# Patient Record
Sex: FEMALE | Race: WHITE | Employment: FULL TIME | URBAN - METROPOLITAN AREA
[De-identification: names, ages, dates, MRNs, and addresses within clinical notes are randomized per-mention and may not be internally consistent; named-entity substitution may affect disease eponyms.]

---

## 2020-06-09 ENCOUNTER — HOSPITAL ENCOUNTER (OUTPATIENT)
Dept: CT IMAGING | Age: 50
Discharge: HOME | End: 2020-06-09
Attending: INTERNAL MEDICINE
Payer: COMMERCIAL

## 2020-06-09 DIAGNOSIS — Z12.11 COLON CANCER SCREENING: ICD-10-CM

## 2020-06-09 DIAGNOSIS — R10.32 LLQ ABDOMINAL PAIN: ICD-10-CM

## 2020-06-09 DIAGNOSIS — R11.0 NAUSEA: ICD-10-CM

## 2020-06-09 PROCEDURE — 74011636320 HC RX REV CODE- 636/320: Performed by: INTERNAL MEDICINE

## 2020-06-09 PROCEDURE — 74011000255 HC RX REV CODE- 255: Performed by: INTERNAL MEDICINE

## 2020-06-09 PROCEDURE — 74177 CT ABD & PELVIS W/CONTRAST: CPT

## 2020-06-09 RX ORDER — SODIUM CHLORIDE 0.9 % (FLUSH) 0.9 %
10 SYRINGE (ML) INJECTION
Status: COMPLETED | OUTPATIENT
Start: 2020-06-09 | End: 2020-06-09

## 2020-06-09 RX ORDER — BARIUM SULFATE 20 MG/ML
900 SUSPENSION ORAL
Status: COMPLETED | OUTPATIENT
Start: 2020-06-09 | End: 2020-06-09

## 2020-06-09 RX ADMIN — IOPAMIDOL 100 ML: 755 INJECTION, SOLUTION INTRAVENOUS at 09:17

## 2020-06-09 RX ADMIN — BARIUM SULFATE 900 ML: 21 SUSPENSION ORAL at 09:17

## 2020-06-09 RX ADMIN — Medication 10 ML: at 09:17

## 2021-07-19 ENCOUNTER — APPOINTMENT (OUTPATIENT)
Dept: MRI IMAGING | Age: 51
DRG: 440 | End: 2021-07-19
Attending: INTERNAL MEDICINE
Payer: COMMERCIAL

## 2021-07-19 ENCOUNTER — HOSPITAL ENCOUNTER (INPATIENT)
Age: 51
LOS: 2 days | Discharge: HOME | DRG: 440 | End: 2021-07-21
Attending: EMERGENCY MEDICINE | Admitting: INTERNAL MEDICINE
Payer: COMMERCIAL

## 2021-07-19 ENCOUNTER — APPOINTMENT (OUTPATIENT)
Dept: CT IMAGING | Age: 51
DRG: 440 | End: 2021-07-19
Attending: EMERGENCY MEDICINE
Payer: COMMERCIAL

## 2021-07-19 DIAGNOSIS — K85.90 ACUTE PANCREATITIS, UNSPECIFIED COMPLICATION STATUS, UNSPECIFIED PANCREATITIS TYPE: Primary | ICD-10-CM

## 2021-07-19 LAB
ALBUMIN SERPL-MCNC: 3.2 G/DL (ref 3.5–5)
ALBUMIN/GLOB SERPL: 0.9 {RATIO} (ref 1.1–2.2)
ALP SERPL-CCNC: 98 U/L (ref 45–117)
ALT SERPL-CCNC: 75 U/L (ref 12–78)
ANION GAP SERPL CALC-SCNC: 5 MMOL/L (ref 5–15)
APPEARANCE UR: CLEAR
AST SERPL-CCNC: 112 U/L (ref 15–37)
BACTERIA URNS QL MICRO: NEGATIVE /HPF
BASOPHILS # BLD: 0 K/UL (ref 0–0.1)
BASOPHILS NFR BLD: 1 % (ref 0–1)
BILIRUB SERPL-MCNC: 0.7 MG/DL (ref 0.2–1)
BILIRUB UR QL: NEGATIVE
BUN SERPL-MCNC: 17 MG/DL (ref 6–20)
BUN/CREAT SERPL: 21 (ref 12–20)
CALCIUM SERPL-MCNC: 8.2 MG/DL (ref 8.5–10.1)
CHLORIDE SERPL-SCNC: 104 MMOL/L (ref 97–108)
CO2 SERPL-SCNC: 29 MMOL/L (ref 21–32)
COLOR UR: ABNORMAL
CREAT SERPL-MCNC: 0.82 MG/DL (ref 0.55–1.02)
DIFFERENTIAL METHOD BLD: NORMAL
EOSINOPHIL # BLD: 0.1 K/UL (ref 0–0.4)
EOSINOPHIL NFR BLD: 1 % (ref 0–7)
EPITH CASTS URNS QL MICRO: ABNORMAL /LPF
ERYTHROCYTE [DISTWIDTH] IN BLOOD BY AUTOMATED COUNT: 12.6 % (ref 11.5–14.5)
GLOBULIN SER CALC-MCNC: 3.7 G/DL (ref 2–4)
GLUCOSE SERPL-MCNC: 96 MG/DL (ref 65–100)
GLUCOSE UR STRIP.AUTO-MCNC: NEGATIVE MG/DL
HCT VFR BLD AUTO: 41.3 % (ref 35–47)
HGB BLD-MCNC: 13.1 G/DL (ref 11.5–16)
HGB UR QL STRIP: ABNORMAL
HYALINE CASTS URNS QL MICRO: ABNORMAL /LPF (ref 0–5)
IMM GRANULOCYTES # BLD AUTO: 0 K/UL (ref 0–0.04)
IMM GRANULOCYTES NFR BLD AUTO: 0 % (ref 0–0.5)
KETONES UR QL STRIP.AUTO: NEGATIVE MG/DL
LEUKOCYTE ESTERASE UR QL STRIP.AUTO: ABNORMAL
LIPASE SERPL-CCNC: 2116 U/L (ref 73–393)
LIPASE SERPL-CCNC: >3000 U/L (ref 73–393)
LYMPHOCYTES # BLD: 1.9 K/UL (ref 0.8–3.5)
LYMPHOCYTES NFR BLD: 25 % (ref 12–49)
MCH RBC QN AUTO: 29.3 PG (ref 26–34)
MCHC RBC AUTO-ENTMCNC: 31.7 G/DL (ref 30–36.5)
MCV RBC AUTO: 92.4 FL (ref 80–99)
MONOCYTES # BLD: 0.5 K/UL (ref 0–1)
MONOCYTES NFR BLD: 7 % (ref 5–13)
NEUTS SEG # BLD: 5.2 K/UL (ref 1.8–8)
NEUTS SEG NFR BLD: 66 % (ref 32–75)
NITRITE UR QL STRIP.AUTO: NEGATIVE
NRBC # BLD: 0 K/UL (ref 0–0.01)
NRBC BLD-RTO: 0 PER 100 WBC
PH UR STRIP: 6 [PH] (ref 5–8)
PLATELET # BLD AUTO: 199 K/UL (ref 150–400)
PMV BLD AUTO: 10.7 FL (ref 8.9–12.9)
POTASSIUM SERPL-SCNC: 3.7 MMOL/L (ref 3.5–5.1)
PROT SERPL-MCNC: 6.9 G/DL (ref 6.4–8.2)
PROT UR STRIP-MCNC: NEGATIVE MG/DL
RBC # BLD AUTO: 4.47 M/UL (ref 3.8–5.2)
RBC #/AREA URNS HPF: ABNORMAL /HPF (ref 0–5)
SODIUM SERPL-SCNC: 138 MMOL/L (ref 136–145)
SP GR UR REFRACTOMETRY: >1.03 (ref 1–1.03)
UA: UC IF INDICATED,UAUC: ABNORMAL
UROBILINOGEN UR QL STRIP.AUTO: 1 EU/DL (ref 0.2–1)
WBC # BLD AUTO: 7.8 K/UL (ref 3.6–11)
WBC URNS QL MICRO: ABNORMAL /HPF (ref 0–4)

## 2021-07-19 PROCEDURE — 97162 PT EVAL MOD COMPLEX 30 MIN: CPT

## 2021-07-19 PROCEDURE — 87086 URINE CULTURE/COLONY COUNT: CPT

## 2021-07-19 PROCEDURE — 74011000636 HC RX REV CODE- 636: Performed by: EMERGENCY MEDICINE

## 2021-07-19 PROCEDURE — 97116 GAIT TRAINING THERAPY: CPT

## 2021-07-19 PROCEDURE — 74011250636 HC RX REV CODE- 250/636: Performed by: INTERNAL MEDICINE

## 2021-07-19 PROCEDURE — 74177 CT ABD & PELVIS W/CONTRAST: CPT

## 2021-07-19 PROCEDURE — 74011250636 HC RX REV CODE- 250/636: Performed by: EMERGENCY MEDICINE

## 2021-07-19 PROCEDURE — 74011250637 HC RX REV CODE- 250/637: Performed by: INTERNAL MEDICINE

## 2021-07-19 PROCEDURE — 97530 THERAPEUTIC ACTIVITIES: CPT

## 2021-07-19 PROCEDURE — 96374 THER/PROPH/DIAG INJ IV PUSH: CPT

## 2021-07-19 PROCEDURE — 94760 N-INVAS EAR/PLS OXIMETRY 1: CPT

## 2021-07-19 PROCEDURE — 74011250636 HC RX REV CODE- 250/636

## 2021-07-19 PROCEDURE — 81001 URINALYSIS AUTO W/SCOPE: CPT

## 2021-07-19 PROCEDURE — 99285 EMERGENCY DEPT VISIT HI MDM: CPT

## 2021-07-19 PROCEDURE — 83690 ASSAY OF LIPASE: CPT

## 2021-07-19 PROCEDURE — 80053 COMPREHEN METABOLIC PANEL: CPT

## 2021-07-19 PROCEDURE — 65270000029 HC RM PRIVATE

## 2021-07-19 PROCEDURE — 85025 COMPLETE CBC W/AUTO DIFF WBC: CPT

## 2021-07-19 PROCEDURE — 36415 COLL VENOUS BLD VENIPUNCTURE: CPT

## 2021-07-19 RX ORDER — PANTOPRAZOLE SODIUM 40 MG/1
40 TABLET, DELAYED RELEASE ORAL
Status: DISCONTINUED | OUTPATIENT
Start: 2021-07-20 | End: 2021-07-21 | Stop reason: HOSPADM

## 2021-07-19 RX ORDER — PROMETHAZINE HYDROCHLORIDE 25 MG/1
12.5 TABLET ORAL
Status: DISCONTINUED | OUTPATIENT
Start: 2021-07-19 | End: 2021-07-21 | Stop reason: HOSPADM

## 2021-07-19 RX ORDER — ACETAMINOPHEN 650 MG/1
650 SUPPOSITORY RECTAL
Status: DISCONTINUED | OUTPATIENT
Start: 2021-07-19 | End: 2021-07-21 | Stop reason: HOSPADM

## 2021-07-19 RX ORDER — HYDROCODONE BITARTRATE AND ACETAMINOPHEN 5; 325 MG/1; MG/1
1 TABLET ORAL
COMMUNITY
End: 2021-07-21

## 2021-07-19 RX ORDER — BUPROPION HYDROCHLORIDE 200 MG/1
200 TABLET, EXTENDED RELEASE ORAL DAILY
COMMUNITY

## 2021-07-19 RX ORDER — LORAZEPAM 2 MG/ML
1 INJECTION INTRAMUSCULAR
Status: DISCONTINUED | OUTPATIENT
Start: 2021-07-19 | End: 2021-07-20

## 2021-07-19 RX ORDER — SODIUM CHLORIDE 0.9 % (FLUSH) 0.9 %
5-40 SYRINGE (ML) INJECTION EVERY 8 HOURS
Status: DISCONTINUED | OUTPATIENT
Start: 2021-07-19 | End: 2021-07-21 | Stop reason: HOSPADM

## 2021-07-19 RX ORDER — SIMETHICONE 125 MG
125 TABLET,CHEWABLE ORAL
COMMUNITY

## 2021-07-19 RX ORDER — ACETAMINOPHEN 325 MG/1
650 TABLET ORAL
Status: DISCONTINUED | OUTPATIENT
Start: 2021-07-19 | End: 2021-07-21 | Stop reason: HOSPADM

## 2021-07-19 RX ORDER — CITALOPRAM 20 MG/1
40 TABLET, FILM COATED ORAL DAILY
Status: DISCONTINUED | OUTPATIENT
Start: 2021-07-19 | End: 2021-07-21 | Stop reason: HOSPADM

## 2021-07-19 RX ORDER — ONDANSETRON 2 MG/ML
4 INJECTION INTRAMUSCULAR; INTRAVENOUS
Status: COMPLETED | OUTPATIENT
Start: 2021-07-19 | End: 2021-07-19

## 2021-07-19 RX ORDER — PANTOPRAZOLE SODIUM 40 MG/1
40 TABLET, DELAYED RELEASE ORAL DAILY
COMMUNITY

## 2021-07-19 RX ORDER — CITALOPRAM 40 MG/1
40 TABLET, FILM COATED ORAL DAILY
COMMUNITY

## 2021-07-19 RX ORDER — SODIUM CHLORIDE, SODIUM LACTATE, POTASSIUM CHLORIDE, CALCIUM CHLORIDE 600; 310; 30; 20 MG/100ML; MG/100ML; MG/100ML; MG/100ML
75 INJECTION, SOLUTION INTRAVENOUS CONTINUOUS
Status: DISCONTINUED | OUTPATIENT
Start: 2021-07-19 | End: 2021-07-21 | Stop reason: HOSPADM

## 2021-07-19 RX ORDER — BISACODYL 5 MG
5 TABLET, DELAYED RELEASE (ENTERIC COATED) ORAL DAILY PRN
Status: DISCONTINUED | OUTPATIENT
Start: 2021-07-19 | End: 2021-07-21 | Stop reason: HOSPADM

## 2021-07-19 RX ORDER — BUPROPION HYDROCHLORIDE 100 MG/1
100 TABLET, EXTENDED RELEASE ORAL 2 TIMES DAILY
Status: DISCONTINUED | OUTPATIENT
Start: 2021-07-19 | End: 2021-07-21 | Stop reason: HOSPADM

## 2021-07-19 RX ORDER — HYDROMORPHONE HYDROCHLORIDE 1 MG/ML
0.5 INJECTION, SOLUTION INTRAMUSCULAR; INTRAVENOUS; SUBCUTANEOUS
Status: DISCONTINUED | OUTPATIENT
Start: 2021-07-19 | End: 2021-07-21 | Stop reason: HOSPADM

## 2021-07-19 RX ORDER — ENOXAPARIN SODIUM 100 MG/ML
40 INJECTION SUBCUTANEOUS DAILY
Status: DISCONTINUED | OUTPATIENT
Start: 2021-07-19 | End: 2021-07-21 | Stop reason: HOSPADM

## 2021-07-19 RX ORDER — ONDANSETRON 2 MG/ML
4 INJECTION INTRAMUSCULAR; INTRAVENOUS
Status: DISCONTINUED | OUTPATIENT
Start: 2021-07-19 | End: 2021-07-21 | Stop reason: HOSPADM

## 2021-07-19 RX ORDER — ATORVASTATIN CALCIUM 10 MG/1
10 TABLET, FILM COATED ORAL DAILY
Status: ON HOLD | COMMUNITY
End: 2021-07-21 | Stop reason: SDUPTHER

## 2021-07-19 RX ORDER — IBUPROFEN 200 MG
800 TABLET ORAL
COMMUNITY

## 2021-07-19 RX ORDER — CETIRIZINE HYDROCHLORIDE 10 MG/1
10 CAPSULE, LIQUID FILLED ORAL DAILY
COMMUNITY

## 2021-07-19 RX ORDER — ONDANSETRON 2 MG/ML
INJECTION INTRAMUSCULAR; INTRAVENOUS
Status: COMPLETED
Start: 2021-07-19 | End: 2021-07-19

## 2021-07-19 RX ORDER — SODIUM CHLORIDE 0.9 % (FLUSH) 0.9 %
5-40 SYRINGE (ML) INJECTION AS NEEDED
Status: DISCONTINUED | OUTPATIENT
Start: 2021-07-19 | End: 2021-07-21 | Stop reason: HOSPADM

## 2021-07-19 RX ADMIN — SODIUM CHLORIDE, POTASSIUM CHLORIDE, SODIUM LACTATE AND CALCIUM CHLORIDE 150 ML/HR: 600; 310; 30; 20 INJECTION, SOLUTION INTRAVENOUS at 10:42

## 2021-07-19 RX ADMIN — BUPROPION HYDROCHLORIDE 100 MG: 100 TABLET, EXTENDED RELEASE ORAL at 11:36

## 2021-07-19 RX ADMIN — CITALOPRAM HYDROBROMIDE 40 MG: 20 TABLET ORAL at 11:36

## 2021-07-19 RX ADMIN — SODIUM CHLORIDE 1000 ML: 9 INJECTION, SOLUTION INTRAVENOUS at 05:10

## 2021-07-19 RX ADMIN — ONDANSETRON 4 MG: 2 INJECTION INTRAMUSCULAR; INTRAVENOUS at 05:10

## 2021-07-19 RX ADMIN — SODIUM CHLORIDE, POTASSIUM CHLORIDE, SODIUM LACTATE AND CALCIUM CHLORIDE 150 ML/HR: 600; 310; 30; 20 INJECTION, SOLUTION INTRAVENOUS at 18:54

## 2021-07-19 RX ADMIN — Medication 10 ML: at 16:23

## 2021-07-19 RX ADMIN — IOPAMIDOL 100 ML: 755 INJECTION, SOLUTION INTRAVENOUS at 05:41

## 2021-07-19 RX ADMIN — PROMETHAZINE HYDROCHLORIDE 12.5 MG: 25 TABLET ORAL at 22:38

## 2021-07-19 RX ADMIN — Medication 10 ML: at 22:38

## 2021-07-19 RX ADMIN — ENOXAPARIN SODIUM 40 MG: 40 INJECTION SUBCUTANEOUS at 10:42

## 2021-07-19 RX ADMIN — BUPROPION HYDROCHLORIDE 100 MG: 100 TABLET, EXTENDED RELEASE ORAL at 18:54

## 2021-07-19 NOTE — PROGRESS NOTES
1808 perfect served Lianna Barrios MD \"Patient is chlostrophic. She had a small panic attack in the MRI office. Could you prescribe something for her so we can get her to calm.  So that Radiology can preform the test?\"    Waiting for  Response

## 2021-07-19 NOTE — PROGRESS NOTES
End of Shift Note    Bedside shift change report given to [address] [place] [address] (oncoming nurse) by Ly Ortega (offgoing nurse). Report included the following information SBAR, Kardex, Intake/Output and MAR    Shift worked:  07a7p     Shift summary and any significant changes:     admission today   Incomplete MRI due to patient clostrophic   Can have meds with sips of water   Room air    Concerns for physician to address:  none     Zone phone for oncoming shift:   none       Activity:  Activity Level: Up with Assistance  Number times ambulated in hallways past shift: 0  Number of times OOB to chair past shift: 0    Cardiac:   Cardiac Monitoring: No           Access:   Current line(s): PIV     Genitourinary:   Urinary status: voiding    Respiratory:   O2 Device: None (Room air)  Chronic home O2 use?: NO  Incentive spirometer at bedside: YES     GI:  Last Bowel Movement Date: 07/16/21  Current diet:  DIET NPO Sips of Water with Meds, Sips of Clear Liquids  Passing flatus: YES  Tolerating current diet: YES       Pain Management:   Patient states pain is manageable on current regimen: YES    Skin:  Maximino Score: 20  Interventions: turn team, float heels and increase time out of bed    Patient Safety:  Fall Score:  Total Score: 4  Interventions: bed/chair alarm, gripper socks and pt to call before getting [address] pt has a broken metatarsal prior to this visit  High Fall Risk: Yes    Length of Stay:  Expected LOS: 2d 9h  Actual LOS: [address] LPN  34/42/99  4829

## 2021-07-19 NOTE — H&P
Hospitalist Admission Note    NAME:  Jane Cadet   :   1970   MRN:   [id number]     Date of admit: 2021    PCP: Trey Fine NP    Assessment/Plan:     Acute pancreatitis POA with typical abdominal pain, elevated lipase  Abnormal LFTs POA , ALT 75  Prior cholecystectomy 19 years ago  Presents with 1 day increasing periumbilical epigastric abdominal pain, nausea  Lipase  then increased to greater than 3000  LFTs with increased , AST 75, alkaline phosphatase 98, T bili 0.7  CT scan abdomen with no intra-abdominal pathology, common bile duct is not dilated  Does not consume alcohol  No new medications except for hydrocodone for a left foot fracture   Also taking Motrin for the past few days due to the foot pain  Admit  N.p.o. for now  IV fluids  As needed pain and nausea medication  Check triglyceride level  Ask Dr. Idalia Brewster to see, he did EGD and colonoscopy last year  Serial labs  Hold Lipitor with abnormal LFTs  May need to consider MRCP with the abnormal LFTs   Notes similar episodes in the past like this, not as severe or persistent    Right foot fracture 2021  Mechanical fall, twisted foot and ankle  Seen at urgent care, x-rays with fractures of the foot bones  Currently in a brace and pain medications  Will continue pain meds  Ask orthopedic surgery to see to set up outpatient follow-up    Depression/anxiety  Continue Wellbutrin and Celexa    Hyperlipidemia  Hold statin with elevated LFTs    GERD  Continue Protonix     Obesity POA Body mass index is 31.89 kg/m². Given the patient's current clinical presentation, I have a high level of concern for decompensation if discharged from the emergency department. My assessment of this patient's clinical condition and my plan of care is as noted above.     DVT prophylaxis with lovenox    Code status: full code  NOK:     History     CHIEF COMPLAINT: \"I started developing severe abdominal pain overnight\"    HISTORY OF PRESENT ILLNESS:    49-year-old female    History of cholecystectomy 19 years ago after the birth of one of her children  Hyperlipidemia on Lipitor  GERD on Protonix  Depression anxiety on Wellbutrin and Celexa    Koffi Sosa on 7/17/2021, lost her balance and twisted her right ankle and foot  Pain in the right foot afterwards  Went to a walk-in clinic and was diagnosed with a foot fracture  Placed in a brace and given pain medications with hydrocodone  Pain not well controlled, started taking Motrin past 1 to 2 days  Was told to follow-up with an orthopedic foot surgeon    Notes episodes of periumbilical and epigastric abdominal pain in the past  He describes them as \"gallbladder attacks\" even after her gallbladder was out  Usually just takes Gas-X or has a bowel movement and they would resolve  She never sought medical attention for them    Woke up this morning around 2 AM with increasing abdominal pain  Pain was bandlike pain across her. Umbilical area and in the epigastric area  Progressively severe, 9/10 at its worst   Aching pain that would \"come in waves\"  Associated with nausea without vomiting  Pain similar to her attacks described above, but more severe and did not improve  She came to the emergency room  No other complaints    Emergency room  Normal white blood cell count, chemistries  Liver tests abnormal with an , ALT 75, T bili 0.7  Tender in epigastric area  CT scan abdomen pelvis with no intra abdominal abnormalities, common bile duct not dilated  Initial lipase 2116, then increased to greater than 3000  ER initially was to try to discharge home but when the lipase increased, we were called to admit the patient    Past medical history:  1. Cholecystitis status post cholecystectomy 19 years ago  2. GERD on Protonix  3. Inguinal hernia status post repair  4. Anxiety/depression on bupropion and Celexa  5.   EGD and colonoscopy with Dr. Lexa Rendon 2020, okay per patient report        Social History Tobacco Use    Smoking status: Non-smoker   Substance Use Topics    Alcohol use: Does not drink alcohol        Family history:  2 children healthy  Father  with non-Hodgkin's lymphoma  Mother had atrial fibrillation  Brother has an unknown type of heart condition    No Known Allergies     Prior to Admission medications    Medication Sig Start Date End Date Taking? Authorizing Provider   atorvastatin (LIPITOR) 10 mg tablet atorvastatin 10 mg tablet    OtherEmma MD   buPROPion SR (WELLBUTRIN, ZYBAN) 200 mg SR tablet bupropion HCl  mg tablet,12 hr sustained-release    OtherEmma MD   citalopram (CELEXA) 40 mg tablet citalopram 40 mg tablet    Emma Young MD   HYDROcodone-acetaminophen (NORCO) 5-325 mg per tablet hydrocodone 5 mg-acetaminophen 325 mg tablet   Take 1 tablet every 6-8 hours by oral route as needed.     Other, MD Emma   Cetirizine (ZyrTEC) 10 mg cap Zyrtec    Emma Young MD       Review of symptoms:     POSITIVE= Bold  Negative = not bold  General:  fever, chills, sweats, generalized weaknes  Eyes:    blurred vision, eye pain, double vision  ENT:    Coryza, sore throat, trouble swallowing  Respiratory:   cough, sputum, SOB  Cardiology:   chest pain, orthopnea, PND, edema  Gastrointestinal:  abdominal pain , N/V, diarrhea, constipation, melena or BRBPR  Genitourinary:  Urgency, dysuria, hematuria  Muskuloskeletal :  Joint redness, right foot pain, myalgias  Hematology:  easy bruising, nose or gum bleeding  Dermatological: rash, ulceration  Endocrine:   Polyuria or polydipsia, heat or hold intolerance  Neurological:  Headache, focal motor or sensory changes     Speech difficulties, memory loss  Psychological: depression, agitation      Objective:   VITALS:    Patient Vitals for the past 24 hrs:   Temp Pulse Resp BP SpO2   21 0715 97.5 °F (36.4 °C) 73 12 126/78 99 %   21 0430  76 9 120/79 99 %   21 0402 97.6 °F (36.4 °C) 80 18 132/85 100 %     Temp (24hrs), Av.6 °F (36.4 °C), Min:97.5 °F (36.4 °C), Max:97.6 °F (36.4 °C)      O2 Device: None (Room air)    Wt Readings from Last 12 Encounters:   07/19/21 81.6 kg (180 lb)         PHYSICAL EXAM:     I had a face to face encounter and independently examined this patient on 07/19/21  as outlined below:    General:    Alert, cooperative in no distress     HEENT: Normocephalic, atraumatic    PERRL, Sclera no icterus    Nasal mucosa without masses or discharge  Hearing intact to voice    Oropharynx without erythema or exudate  Neck:  No meningismus, trachea midline, no carotid bruits     Thyroid not enlarged, no nodules or tenderness  Lungs:   Clear to auscultation bilaterally. No wheezing or rales    No accessory muscle use or retractions. Heart:   Regular rate and rhythm,  no murmur or gallop. No LE edema  Abdomen:   Soft, mildly tender epigastric area. Not distended. Bowel sounds normal.     No masses, No Hepatosplenomegaly, No Rebound or guarding  Lymph nodes: No cervical or inguinal TESSY  Musculoskeletal:  No Joint swelling, erythema, warmth.  No Cyanosis or clubbing  Skin:      No rashes     Not Jaundiced   No nodules or thickening    Capillary refill normal  Neurologic: Alert and oriented X 3, follows commands     Cranial nerves 2 to 12 intact    Symmetric motor strength bilaterally       LAB DATA REVIEWED:    Recent Results (from the past 12 hour(s))   CBC WITH AUTOMATED DIFF    Collection Time: 07/19/21  4:06 AM   Result Value Ref Range    WBC 7.8 3.6 - 11.0 K/uL    RBC 4.47 3.80 - 5.20 M/uL    HGB 13.1 11.5 - 16.0 g/dL    HCT 41.3 35.0 - 47.0 %    MCV 92.4 80.0 - 99.0 FL    MCH 29.3 26.0 - 34.0 PG    MCHC 31.7 30.0 - 36.5 g/dL    RDW 12.6 11.5 - 14.5 %    PLATELET 142 080 - 670 K/uL    MPV 10.7 8.9 - 12.9 FL    NRBC 0.0 0  WBC    ABSOLUTE NRBC 0.00 0.00 - 0.01 K/uL    NEUTROPHILS 66 32 - 75 %    LYMPHOCYTES 25 12 - 49 %    MONOCYTES 7 5 - 13 %    EOSINOPHILS 1 0 - 7 %    BASOPHILS 1 0 - 1 %    IMMATURE GRANULOCYTES 0 0.0 - 0.5 %    ABS. NEUTROPHILS 5.2 1.8 - 8.0 K/UL    ABS. LYMPHOCYTES 1.9 0.8 - 3.5 K/UL    ABS. MONOCYTES 0.5 0.0 - 1.0 K/UL    ABS. EOSINOPHILS 0.1 0.0 - 0.4 K/UL    ABS. BASOPHILS 0.0 0.0 - 0.1 K/UL    ABS. IMM. GRANS. 0.0 0.00 - 0.04 K/UL    DF AUTOMATED     METABOLIC PANEL, COMPREHENSIVE    Collection Time: 07/19/21  4:06 AM   Result Value Ref Range    Sodium 138 136 - 145 mmol/L    Potassium 3.7 3.5 - 5.1 mmol/L    Chloride 104 97 - 108 mmol/L    CO2 29 21 - 32 mmol/L    Anion gap 5 5 - 15 mmol/L    Glucose 96 65 - 100 mg/dL    BUN 17 6 - 20 MG/DL    Creatinine 0.82 0.55 - 1.02 MG/DL    BUN/Creatinine ratio 21 (H) 12 - 20      GFR est AA >60 >60 ml/min/1.73m2    GFR est non-AA >60 >60 ml/min/1.73m2    Calcium 8.2 (L) 8.5 - 10.1 MG/DL    Bilirubin, total 0.7 0.2 - 1.0 MG/DL    ALT (SGPT) 75 12 - 78 U/L    AST (SGOT) 112 (H) 15 - 37 U/L    Alk.  phosphatase 98 45 - 117 U/L    Protein, total 6.9 6.4 - 8.2 g/dL    Albumin 3.2 (L) 3.5 - 5.0 g/dL    Globulin 3.7 2.0 - 4.0 g/dL    A-G Ratio 0.9 (L) 1.1 - 2.2     LIPASE    Collection Time: 07/19/21  4:06 AM   Result Value Ref Range    Lipase 2,116 (H) 73 - 393 U/L   URINALYSIS W/ REFLEX CULTURE    Collection Time: 07/19/21  4:06 AM    Specimen: Urine   Result Value Ref Range    Color YELLOW/STRAW      Appearance CLEAR CLEAR      Specific gravity >1.030 (H) 1.003 - 1.030    pH (UA) 6.0 5.0 - 8.0      Protein Negative NEG mg/dL    Glucose Negative NEG mg/dL    Ketone Negative NEG mg/dL    Bilirubin Negative NEG      Blood TRACE (A) NEG      Urobilinogen 1.0 0.2 - 1.0 EU/dL    Nitrites Negative NEG      Leukocyte Esterase TRACE (A) NEG      UA:UC IF INDICATED URINE CULTURE ORDERED (A) CNI      WBC 20-50 0 - 4 /hpf    RBC 5-10 0 - 5 /hpf    Epithelial cells MODERATE (A) FEW /lpf    Bacteria Negative NEG /hpf    Hyaline cast 0-2 0 - 5 /lpf   LIPASE    Collection Time: 07/19/21  6:58 AM   Result Value Ref Range    Lipase >3,000 (H) 73 - 393 U/L     CT scan abdomen/pelvis read by radiology FINDINGS:   LOWER THORAX: No significant abnormality in the incidentally imaged lower chest.  LIVER: No mass. BILIARY TREE: Cholecystectomy CBD is not dilated. SPLEEN: within normal limits. PANCREAS: No mass or ductal dilatation. ADRENALS: Unremarkable. KIDNEYS: No mass, calculus, or hydronephrosis. STOMACH: Unremarkable. SMALL BOWEL: No dilatation or wall thickening. COLON: No dilatation or wall thickening. APPENDIX: Not visualized  PERITONEUM: No ascites or pneumoperitoneum. RETROPERITONEUM: No lymphadenopathy or aortic aneurysm. REPRODUCTIVE ORGANS: Unremarkable  URINARY BLADDER: No mass or calculus. BONES: No destructive bone lesion. ABDOMINAL WALL: No mass or hernia. ADDITIONAL COMMENTS: N/A  IMPRESSION  No acute intra-abdominal pathology      I saw the patient personally, took a history and did a complete physical exam at the bedside. I performed complex decision making in coming up with a diagnostic and treatment plan for the patient. I reviewed the patient's past medical records, current laboratory and radiology results, and actual Xray films/EKG. I have also discussed this case with the involved ED physician.     Care Plan discussed with:    Patient, Son, ED Doc    Risk of deterioration:  High    Total Time Coordinating Admission: 65    minutes    Total Critical Care Time:         Tai Ramirez MD

## 2021-07-19 NOTE — PROGRESS NOTES
Pharmacy Clarification of the Prior to Admission Medication Regimen Retrospective to the Admission Medication Reconciliation    The patient was interviewed regarding clarification of the prior to admission medication regimen. She was questioned regarding use of any other inhalers, topical products, over the counter medications, herbal medications, vitamin products or ophthalmic/nasal/otic medication use. Information Obtained From: query, patient    Recommendations/Findings: The following amendments were made to the patient's active medication list on file at [facility]:     1) Additions:   IBU  Gas X  Protonix      2) Removals:       3) Changes: Wellbutrin  (Old regimen: 200 mg twice daily /New regimen: 200 mg daily)      4) Pertinent Pharmacy Findings:  Updated patients preferred outpatient pharmacy to: [facility] medication list was corrected to the following:     Prior to Admission Medications   Prescriptions Last Dose Informant Taking? Cetirizine (ZyrTEC) 10 mg cap 7/17/2021 Self Yes   Sig: Take 10 mg by mouth daily. HYDROcodone-acetaminophen (NORCO) 5-325 mg per tablet 7/18/2021 at Unknown time Self Yes   Sig: Take 1 Tablet by mouth every six (6) hours as needed. atorvastatin (LIPITOR) 10 mg tablet 7/17/2021 Self Yes   Sig: Take 10 mg by mouth daily. buPROPion SR (WELLBUTRIN, ZYBAN) 200 mg SR tablet 7/17/2021 Self Yes   Sig: Take 200 mg by mouth daily. citalopram (CELEXA) 40 mg tablet 7/17/2021 Self Yes   Sig: Take 40 mg by mouth daily. ibuprofen (MOTRIN) 200 mg tablet 7/18/2021 at Unknown time Self Yes   Sig: Take 800 mg by mouth every eight (8) hours as needed for Pain.   pantoprazole (Protonix) 40 mg tablet 7/17/2021 Self Yes   Sig: Take 40 mg by mouth daily. simethicone (Gas-X Extra Strength) 125 mg chewable tablet 7/18/2021 at Unknown time Self Yes   Sig: Take 125 mg by mouth every six (6) hours as needed for Flatulence.       Facility-Administered Medications: None Thank you,  Unknown Pace, [id number] [facility]  Medication History Pharmacy Technician

## 2021-07-19 NOTE — PROGRESS NOTES
Problem: Mobility Impaired (Adult and Pediatric)  Goal: *Acute Goals and Plan of Care (Insert Text)  Description: FUNCTIONAL STATUS PRIOR TO ADMISSION: Patient was independent and active without use of DME.    HOME SUPPORT PRIOR TO ADMISSION: The patient lived with her 2 teenage children but did not require assist.    Physical Therapy Goals  Initiated 7/19/2021  1. Patient will move from supine to sit and sit to supine  in bed with modified independence within 7 day(s). 2.  Patient will transfer from bed to chair and chair to bed with modified independence using the least restrictive device within 7 day(s). 3.  Patient will perform sit to stand with modified independence within 7 day(s). 4.  Patient will ambulate with modified independence for 100 feet with the least restrictive device within 7 day(s). 5.  Patient will ascend/descend 4 stairs with 1 handrail(s) with modified independence within 7 day(s). Outcome: Progressing Towards Goal    PHYSICAL THERAPY EVALUATION  Patient: Escobar Gutiérrez (80 y.o. female)  Date: 7/19/2021  Primary Diagnosis: Acute pancreatitis [K85.90]        Precautions: right NWB       ASSESSMENT  Based on the objective data described below, the patient presents with impaired balance, endurance, and standing activity tolerance following admission for acute pancreatitis. Patient had a fall 7/17 and was diagnosed with a right robles fx. She is NWB in a cam boot and was using crutches PTA but reports difficulty managing them at home. Adjusted crutch height and demonstrated safe use while maintaining NWB. Gait training completed with patient 3x20' total using crutches and a RW requiring SBA. Patient demonstrated adequate safety using crutches and was able to maintain NWB over short distances, however, fatigued quickly and demonstrated increased A/P sway.  Stability and patient confidence improved using a RW for a 2nd short trial.     The patient owns crutches, believes that she has access to a RW, and plans to borrow a knee scooter. Discussed each and encouraged her to borrow the knee scooter to allow for improved stability and increased distances. Also discussed completing stairs and strategies for getting up from the floor if ascended the stairs by \"bumping up\". Patient does not have discharge PT needs at present but would benefit from an additional session for transfer training with crutches and to assess stairs PRN. Current Level of Function Impacting Discharge (mobility/balance): SBA-CGA for crutches         Patient will benefit from skilled therapy intervention to address the above noted impairments. PLAN :  Recommendations and Planned Interventions: bed mobility training, transfer training, gait training, therapeutic exercises, and therapeutic activities      Frequency/Duration: Patient will be followed by physical therapy:  3 times a week to address goals. Recommendation for discharge: (in order for the patient to meet his/her long term goals)  No skilled physical therapy/ follow up rehabilitation needs identified at this time. IF patient discharges home will need the following DME: patient owns DME required for discharge         SUBJECTIVE:   Patient stated Thank you. I feel better after doing that.     OBJECTIVE DATA SUMMARY:   HISTORY:    No past medical history on file. No past surgical history on file. Personal factors and/or comorbidities impacting plan of care:     Home Situation  Home Environment: (P) Private residence  # Steps to Enter: 3  Rails to Enter: Yes  One/Two Story Residence: (P) Two story  Living Alone: No  Support Systems: Child(jalen) (14m 19 children)  Tub or Shower Type: Shower    EXAMINATION/PRESENTATION/DECISION MAKING:   Critical Behavior:              Hearing: Auditory  Auditory Impairment: None  Skin:    Edema:   Range Of Motion:  AROM: Within functional limits                       Strength:    Strength:  Within functional limits Tone & Sensation:   Tone: Normal              Sensation: Intact               Coordination:  Coordination: Within functional limits  Vision:      Functional Mobility:  Bed Mobility:     Supine to Sit: Supervision  Sit to Supine: Supervision     Transfers:  Sit to Stand: Contact guard assistance  Stand to Sit: Contact guard assistance                       Balance:   Sitting: Intact  Standing: Impaired  Standing - Static: Fair  Standing - Dynamic : Fair  Ambulation/Gait Training:  Distance (ft): 20 Feet (ft) (x3 in room)  Assistive Device: Gait belt;Walker, rolling;Crutches  Ambulation - Level of Assistance: Stand-by assistance     Gait Description (WDL): Exceptions to WDL  Gait Abnormalities: Decreased step clearance  Right Side Weight Bearing: Non-weight bearing           Speed/Sharla: Slow  Step Length: Left shortened                          Physical Therapy Evaluation Charge Determination   History Examination Presentation Decision-Making   MEDIUM  Complexity : 1-2 comorbidities / personal factors will impact the outcome/ POC  MEDIUM Complexity : 3 Standardized tests and measures addressing body structure, function, activity limitation and / or participation in recreation  LOW Complexity : Stable, uncomplicated  LOW Complexity : FOTO score of       Based on the above components, the patient evaluation is determined to be of the following complexity level: LOW     Pain Ratin-2/10 during session in right ankle    Activity Tolerance:   Good    After treatment patient left in no apparent distress:   Supine in bed and Call bell within reach    COMMUNICATION/EDUCATION:   The patients plan of care was discussed with: Registered nurse. Fall prevention education was provided and the patient/caregiver indicated understanding., Patient/family have participated as able in goal setting and plan of care. , and Patient/family agree to work toward stated goals and plan of care.     Thank you for this referral.  Angie Laird, PT, DPT   Time Calculation: 32 mins

## 2021-07-19 NOTE — PROGRESS NOTES
Admission assessment completed by this RN upon arrival to surgical-telemetry unit. Care plan and education initiated. Marquita Sr LPN to assume care of patient at this time.        Jamila Michelle

## 2021-07-19 NOTE — PROGRESS NOTES
MRI Pending:    [facility] Sign and fax to [phone]. Call [phone] once faxed.

## 2021-07-19 NOTE — ED PROVIDER NOTES
EMERGENCY DEPARTMENT HISTORY AND PHYSICAL EXAM      Date: 7/19/2021  Patient Name: Jane Cadet    History of Presenting Illness     Chief Complaint   Patient presents with    Abdominal Pain     ED visit d/t abd abd, generalized - onset of sxs, this evening - pain \" achy, stabby and cyclic \" - nausea with no active vomiting - ORTHO boot applied to (R) LE d/t recent fx;;       History Provided By: Patient    HPI: Jane Cadet, 46 y.o. female with PMHx significant for depression, allergies, GERD, hyperlipidemia, and status post cholecystectomy presents to the ED with waxing and waning upper abdominal pain that started about 2 AM.  Patient describes her pain as sharp, achy, and stabbing. Initially it was 9 out of 10 in intensity. At the time of my interview it is 5 out of 10 in intensity. Pain is located all the way across her upper abdomen and does not radiate patient thought that she might be constipated because she has been on pain medication for a broken foot for the last 2 days. Last bowel movement was 2 days ago she tried to have a bowel movement and took Gas-X without any change in her pain. Reports that she has had decreased appetite for the last several days, but denies any nausea, vomiting, diarrhea. Has been taking 800 mg of ibuprofen every 8 hours for her recent foot fracture as well. She denies alcohol use or drug use. She is a non-smoker. PCP: Trey Fine NP    No current facility-administered medications on file prior to encounter.      Current Outpatient Medications on File Prior to Encounter   Medication Sig Dispense Refill    atorvastatin (LIPITOR) 10 mg tablet atorvastatin 10 mg tablet      buPROPion SR (WELLBUTRIN, ZYBAN) 200 mg SR tablet bupropion HCl  mg tablet,12 hr sustained-release      citalopram (CELEXA) 40 mg tablet citalopram 40 mg tablet      HYDROcodone-acetaminophen (NORCO) 5-325 mg per tablet hydrocodone 5 mg-acetaminophen 325 mg tablet   Take 1 tablet every 6-8 hours by oral route as needed.  Cetirizine (ZyrTEC) 10 mg cap Zyrtec         Past History     Past Medical History:  No past medical history on file. Past Surgical History:  No past surgical history on file. Family History:  No family history on file. Social History:  Social History     Tobacco Use    Smoking status: Not on file   Substance Use Topics    Alcohol use: Not on file    Drug use: Not on file       Allergies:  No Known Allergies      Review of Systems   Review of Systems   Constitutional: Negative for chills and fever. HENT: Negative for congestion and rhinorrhea. Respiratory: Negative for cough, chest tightness, shortness of breath and wheezing. Cardiovascular: Negative for chest pain and palpitations. Gastrointestinal: Positive for abdominal pain and constipation. Negative for blood in stool, diarrhea, nausea and vomiting. Genitourinary: Negative for dysuria, flank pain and hematuria. Musculoskeletal: Negative for back pain, myalgias and neck pain. Neurological: Negative for dizziness, syncope, light-headedness and headaches. Psychiatric/Behavioral: Negative for confusion. The patient is nervous/anxious. All other systems reviewed and are negative.         Physical Exam    General appearance -overweight, well appearing, and in no distress  Eyes - pupils equal and reactive, extraocular eye movements intact  ENT - mucous membranes moist, pharynx normal without lesions  Neck - supple, no significant adenopathy; non-tender to palpation  Chest - clear to auscultation, no wheezes, rales or rhonchi; non-tender to palpation  Heart - normal rate and regular rhythm, S1 and S2 normal, no murmurs noted  Abdomen - soft, tender across upper abdomen, nondistended, no masses or organomegaly  Musculoskeletal - no joint tenderness, deformity or swelling; normal ROM  Extremities - peripheral pulses normal, no pedal edema, right foot in walking boot  Skin - normal coloration and turgor, no rashes  Neurological - alert, oriented x3, normal speech, no focal findings or movement disorder noted    Diagnostic Study Results     Labs -     Recent Results (from the past 12 hour(s))   CBC WITH AUTOMATED DIFF    Collection Time: 07/19/21  4:06 AM   Result Value Ref Range    WBC 7.8 3.6 - 11.0 K/uL    RBC 4.47 3.80 - 5.20 M/uL    HGB 13.1 11.5 - 16.0 g/dL    HCT 41.3 35.0 - 47.0 %    MCV 92.4 80.0 - 99.0 FL    MCH 29.3 26.0 - 34.0 PG    MCHC 31.7 30.0 - 36.5 g/dL    RDW 12.6 11.5 - 14.5 %    PLATELET 108 998 - 810 K/uL    MPV 10.7 8.9 - 12.9 FL    NRBC 0.0 0  WBC    ABSOLUTE NRBC 0.00 0.00 - 0.01 K/uL    NEUTROPHILS 66 32 - 75 %    LYMPHOCYTES 25 12 - 49 %    MONOCYTES 7 5 - 13 %    EOSINOPHILS 1 0 - 7 %    BASOPHILS 1 0 - 1 %    IMMATURE GRANULOCYTES 0 0.0 - 0.5 %    ABS. NEUTROPHILS 5.2 1.8 - 8.0 K/UL    ABS. LYMPHOCYTES 1.9 0.8 - 3.5 K/UL    ABS. MONOCYTES 0.5 0.0 - 1.0 K/UL    ABS. EOSINOPHILS 0.1 0.0 - 0.4 K/UL    ABS. BASOPHILS 0.0 0.0 - 0.1 K/UL    ABS. IMM. GRANS. 0.0 0.00 - 0.04 K/UL    DF AUTOMATED     METABOLIC PANEL, COMPREHENSIVE    Collection Time: 07/19/21  4:06 AM   Result Value Ref Range    Sodium 138 136 - 145 mmol/L    Potassium 3.7 3.5 - 5.1 mmol/L    Chloride 104 97 - 108 mmol/L    CO2 29 21 - 32 mmol/L    Anion gap 5 5 - 15 mmol/L    Glucose 96 65 - 100 mg/dL    BUN 17 6 - 20 MG/DL    Creatinine 0.82 0.55 - 1.02 MG/DL    BUN/Creatinine ratio 21 (H) 12 - 20      GFR est AA >60 >60 ml/min/1.73m2    GFR est non-AA >60 >60 ml/min/1.73m2    Calcium 8.2 (L) 8.5 - 10.1 MG/DL    Bilirubin, total 0.7 0.2 - 1.0 MG/DL    ALT (SGPT) 75 12 - 78 U/L    AST (SGOT) 112 (H) 15 - 37 U/L    Alk.  phosphatase 98 45 - 117 U/L    Protein, total 6.9 6.4 - 8.2 g/dL    Albumin 3.2 (L) 3.5 - 5.0 g/dL    Globulin 3.7 2.0 - 4.0 g/dL    A-G Ratio 0.9 (L) 1.1 - 2.2     LIPASE    Collection Time: 07/19/21  4:06 AM   Result Value Ref Range    Lipase 2,116 (H) 73 - 393 U/L   URINALYSIS W/ REFLEX CULTURE    Collection Time: 07/19/21  4:06 AM    Specimen: Urine   Result Value Ref Range    Color YELLOW/STRAW      Appearance CLEAR CLEAR      Specific gravity >1.030 (H) 1.003 - 1.030    pH (UA) 6.0 5.0 - 8.0      Protein Negative NEG mg/dL    Glucose Negative NEG mg/dL    Ketone Negative NEG mg/dL    Bilirubin Negative NEG      Blood TRACE (A) NEG      Urobilinogen 1.0 0.2 - 1.0 EU/dL    Nitrites Negative NEG      Leukocyte Esterase TRACE (A) NEG      UA:UC IF INDICATED URINE CULTURE ORDERED (A) CNI      WBC 20-50 0 - 4 /hpf    RBC 5-10 0 - 5 /hpf    Epithelial cells MODERATE (A) FEW /lpf    Bacteria Negative NEG /hpf    Hyaline cast 0-2 0 - 5 /lpf   LIPASE    Collection Time: 07/19/21  6:58 AM   Result Value Ref Range    Lipase >3,000 (H) 73 - 393 U/L       Radiologic Studies -   CT ABD PELV W CONT   Final Result   No acute intra-abdominal pathology        CT Results  (Last 48 hours)               07/19/21 0541  CT ABD PELV W CONT Final result    Impression:  No acute intra-abdominal pathology       Narrative:  EXAM: CT ABD PELV W CONT       INDICATION: abd pain, elevated lipase       COMPARISON: None        CONTRAST: 100 mL of Isovue-370. TECHNIQUE:    Following the uneventful intravenous administration of contrast, thin axial   images were obtained through the abdomen and pelvis. Coronal and sagittal   reconstructions were generated. Oral contrast was not administered. CT dose   reduction was achieved through use of a standardized protocol tailored for this   examination and automatic exposure control for dose modulation. FINDINGS:    LOWER THORAX: No significant abnormality in the incidentally imaged lower chest.   LIVER: No mass. BILIARY TREE: Cholecystectomy CBD is not dilated. SPLEEN: within normal limits. PANCREAS: No mass or ductal dilatation. ADRENALS: Unremarkable. KIDNEYS: No mass, calculus, or hydronephrosis. STOMACH: Unremarkable.    SMALL BOWEL: No dilatation or wall thickening. COLON: No dilatation or wall thickening. APPENDIX: Not visualized   PERITONEUM: No ascites or pneumoperitoneum. RETROPERITONEUM: No lymphadenopathy or aortic aneurysm. REPRODUCTIVE ORGANS: Unremarkable   URINARY BLADDER: No mass or calculus. BONES: No destructive bone lesion. ABDOMINAL WALL: No mass or hernia. ADDITIONAL COMMENTS: N/A               CXR Results  (Last 48 hours)    None            Medical Decision Making   I am the first provider for this patient. I reviewed the vital signs, available nursing notes, past medical history, past surgical history, family history and social history. Vital Signs-Reviewed the patient's vital signs. Patient Vitals for the past 12 hrs:   Temp Pulse Resp BP SpO2   07/19/21 0715 97.5 °F (36.4 °C) 73 12 126/78 99 %   07/19/21 0430  76 9 120/79 99 %   07/19/21 0402 97.6 °F (36.4 °C) 80 18 132/85 100 %           Records Reviewed: Nursing Notes and Old Medical Records    Provider Notes (Medical Decision Making):   Differential diagnosis: Gas pains, constipation, gastritis, pancreatitis, bowel obstruction  We will check CBC, CMP, lipase, abdominal pelvis CT. ED Course:   Initial assessment performed. The patients presenting problems have been discussed, and they are in agreement with the care plan formulated and outlined with them. I have encouraged them to ask questions as they arise throughout their visit. Progress Notes:  ED Course as of Jul 19 0750   Mon Jul 19, 2021   8664 Patient is feeling much better. Lipase is elevated at 2100. LFTs are normal.  Patient is no longer having significant pain and is not nauseous. CT is unremarkable. Will repeat lipase and p.o. challenge patient. If lipase is improving and patient is able to tolerate fluids, will discharge with analgesics and Zofran and encourage patient to follow-up with GI.   Return to ED for worsening symptoms.    [AO]   2601 Patient still feeling better, however repeat lipase is now greater than 3000. Case discussed with Dr. Hi Shelton (hospitalist) who will see and admit the patient.    [AO]      ED Course User Index  [AO] Mercy Fischer MD       Disposition:  Admit to hospitalist        Diagnosis     Clinical Impression:   1.  Acute pancreatitis, unspecified complication status, unspecified pancreatitis type

## 2021-07-19 NOTE — PROGRESS NOTES
Transition of Care Plan:    RUR: 6 %  Disposition: Home  Follow up appointments: PCP  DME needed: Pt stated that she has crutches at home; also has a walking boot on right foot  Transportation at Discharge: son to transport   Keys or means to access home:  Yes  IM Medicare letter: N/A commercial insurance   Caregiver Contact: Aunt Bailey Rodriguez [phone]  Discharge Caregiver contacted prior to discharge? Unit CM to follow up before discharge    Reason for Admission:  Acute Pancreatitis                   RUR Score:       6 %              Plan for utilizing home health:      TBD    PCP: First and Last name:  Sierra Alston NP     Name of Practice:    Are you a current patient: Yes/No: Yes   Approximate date of last visit: earlier this year    Can you participate in a virtual visit with your PCP: Yes                    Current Advanced Directive/Advance Care Plan: Full Code  Advance Care Planning     General Advance Care Planning (ACP) Conversation      Date of Conversation: 2021  Conducted with: Patient with Decision Making Capacity    Healthcare Decision Maker:     Click here to complete [address] including selection of the [address] Relationship (ie \"Primary\")  Today we documented Decision Maker(s) consistent with Legal Next of Kin hierarchy. Content/Action Overview:   DECLINED ACP conversation - will revisit periodically   Reviewed DNR/DNI and patient elects Full Code (Attempt Resuscitation)     Length of Voluntary ACP Conversation in minutes:  <16 minutes (Non-Billable)    Doroteo Simmons RN                        Transition of Care Plan:                    CM met with patient at the bedside to discuss discharge planning. Patient name, , and demographics all verified in chart. Patient recently seen at [facility] on Saturday and was diagnosed with a right foot fracture.  Patient was given a walking boot and crutches and instructed to follow up with ortho. Patient lives in a two level home. Patient is independent of her ADLS/IDLS. Patient works full time. Patient preferred pharmacy is [facility] in [facility], [address] [facility]. Patient has no SNF, HH, or IPR history. Patient is active with her PCP every 6 months. Care Management Interventions  PCP Verified by CM: Yes  Mode of Transport at Discharge: Other (see comment) (Patient son to transport )  Transition of Care Consult (CM Consult): Discharge Planning  Discharge Durable Medical Equipment: No (Patient has a walking boot on as well as crutches at home)  Physical Therapy Consult: Yes  Occupational Therapy Consult: No  Speech Therapy Consult: No  Current Support Network: Own Home, Lives Alone  Confirm Follow Up Transport: Family  Discharge Location  Discharge Placement: Home    Unit CM to continue to follow for discharge needs and planning.     Lima Smith, PATRICIAN, RN, BSW   RN Care Manager

## 2021-07-19 NOTE — CONSULTS
GI Consultation Note Florinda Arzolae)    NAME: Babak Hutchison : 1970 MRN: [id number]   PCP: Desean Fabian NP  Date/Time:  2021 10:26 AM  Subjective:   REASON FOR CONSULT:    ?Acute pancreatitsi    Rachele Botello is a 46 y.o.   female who I was asked to see for above. Pt had a right foot fracture 2 day PTA and subsequently presented to ED this AM with severe epigastric/periumbillical pain radiating to back. +Nausea. +REmote h/o cholecystectomy. Similar sx in past though never this severe. No Etoh. Only new medication was Norco    EGD/Colon  (myself):  EGD: HH, gastritis  Colon: IH otherwise wnl      No past medical history on file. No past surgical history on file. Social History     Tobacco Use    Smoking status: Not on file   Substance Use Topics    Alcohol use: Not on file      No family history on file. No Known Allergies   Home Medications:  Prior to Admission Medications   Prescriptions Last Dose Informant Patient Reported? Taking? Cetirizine (ZyrTEC) 10 mg cap   Yes No   Sig: Take 10 mg by mouth daily. HYDROcodone-acetaminophen (NORCO) 5-325 mg per tablet   Yes No   Sig: Take 1 Tablet by mouth every six (6) hours as needed. atorvastatin (LIPITOR) 10 mg tablet   Yes No   Sig: Take 10 mg by mouth daily. buPROPion SR (WELLBUTRIN, ZYBAN) 200 mg SR tablet   Yes No   Sig: Take 200 mg by mouth two (2) times a day. citalopram (CELEXA) 40 mg tablet   Yes No   Sig: Take 40 mg by mouth daily.       Facility-Administered Medications: None     Hospital medications:  Current Facility-Administered Medications   Medication Dose Route Frequency    sodium chloride (NS) flush 5-40 mL  5-40 mL IntraVENous Q8H    sodium chloride (NS) flush 5-40 mL  5-40 mL IntraVENous PRN    acetaminophen (TYLENOL) tablet 650 mg  650 mg Oral Q6H PRN    Or    acetaminophen (TYLENOL) suppository 650 mg  650 mg Rectal Q6H PRN    bisacodyL (DULCOLAX) tablet 5 mg  5 mg Oral DAILY PRN    promethazine (PHENERGAN) tablet 12.5 mg  12.5 mg Oral Q6H PRN    Or    ondansetron (ZOFRAN) injection 4 mg  4 mg IntraVENous Q6H PRN    enoxaparin (LOVENOX) injection 40 mg  40 mg SubCUTAneous DAILY    lactated Ringers infusion  150 mL/hr IntraVENous CONTINUOUS    HYDROmorphone (DILAUDID) injection 0.5 mg  0.5 mg IntraVENous Q3H PRN    [START ON 7/20/2021] pantoprazole (PROTONIX) tablet 40 mg  40 mg Oral ACB    citalopram (CELEXA) tablet 40 mg  40 mg Oral DAILY    buPROPion SR (WELLBUTRIN SR) tablet 100 mg  100 mg Oral BID     Current Outpatient Medications   Medication Sig    atorvastatin (LIPITOR) 10 mg tablet Take 10 mg by mouth daily.  buPROPion SR (WELLBUTRIN, ZYBAN) 200 mg SR tablet Take 200 mg by mouth two (2) times a day.  citalopram (CELEXA) 40 mg tablet Take 40 mg by mouth daily.  HYDROcodone-acetaminophen (NORCO) 5-325 mg per tablet Take 1 Tablet by mouth every six (6) hours as needed.  Cetirizine (ZyrTEC) 10 mg cap Take 10 mg by mouth daily.      REVIEW OF SYSTEMS:     []     Unable to obtain  ROS due to  []    mental status change  []    sedated   []    intubated   [x]    Total of 11 systems reviewed as follows:  Const:  negative fever, negative chills, negative weight loss  Eyes:   negative diplopia or visual changes, negative eye pain  ENT:   negative coryza, negative sore throat  Resp:   negative cough, hemoptysis, dyspnea  Cards:  negative for chest pain, palpitations, lower extremity edema  :  negative for frequency, dysuria and hematuria  Skin:   negative for rash and pruritus  Heme:  negative for easy bruising and gum/nose bleeding  MS:  negative for myalgias, arthralgias, back pain and muscle weakness  Neurolo:  negative for headaches, dizziness, vertigo, memory problems   Psych:  negative for feelings of anxiety, depression     Pertinent Positives include :    Objective:   VITALS:    Visit Vitals  /84   Pulse 75   Temp 97.5 °F (36.4 °C)   Resp 9   Ht 5' 3\" (1.6 m)   Wt 81.6 kg (180 lb)   SpO2 99%   BMI 31.89 kg/m²     Temp (24hrs), Av.6 °F (36.4 °C), Min:97.5 °F (36.4 °C), Max:97.6 °F (36.4 °C)    PHYSICAL EXAM:   General:    Alert, cooperative, no distress, appears stated age. Head:   Normocephalic, without obvious abnormality, atraumatic. Eyes:   Conjunctivae clear, anicteric sclerae. Pupils are equal  Nose:  Nares normal. No drainage or sinus tenderness. Throat:    Lips, mucosa, and tongue normal.  No Thrush  Neck:  Supple, symmetrical,  no adenopathy, thyroid: non tender  Back:    Symmetric,  No CVA tenderness. Lungs:   CTA bilaterally. No wheezing/rhonchi/rales. Chest wall:  No tenderness or deformity. No Accessory muscle use. Heart:   Regular rate and rhythm,  no murmur, rub or gallop. Abdomen:   Soft, non-tender. Not distended. Bowel sounds normal. No masses  Extremities: Atraumatic, No cyanosis. No edema. No clubbing  Skin:     Texture, turgor normal. No rashes/lesions/jaundice  Lymph: Cervical, supraclavicular normal.  Psych:  Good insight. Not depressed. Not anxious or agitated. Neurologic: EOMs intact. No facial asymmetry. No aphasia or slurred speech. Normal  strength, A/O X 3. LAB DATA REVIEWED:    Recent Results (from the past 48 hour(s))   CBC WITH AUTOMATED DIFF    Collection Time: 21  4:06 AM   Result Value Ref Range    WBC 7.8 3.6 - 11.0 K/uL    RBC 4.47 3.80 - 5.20 M/uL    HGB 13.1 11.5 - 16.0 g/dL    HCT 41.3 35.0 - 47.0 %    MCV 92.4 80.0 - 99.0 FL    MCH 29.3 26.0 - 34.0 PG    MCHC 31.7 30.0 - 36.5 g/dL    RDW 12.6 11.5 - 14.5 %    PLATELET 100 867 - 501 K/uL    MPV 10.7 8.9 - 12.9 FL    NRBC 0.0 0  WBC    ABSOLUTE NRBC 0.00 0.00 - 0.01 K/uL    NEUTROPHILS 66 32 - 75 %    LYMPHOCYTES 25 12 - 49 %    MONOCYTES 7 5 - 13 %    EOSINOPHILS 1 0 - 7 %    BASOPHILS 1 0 - 1 %    IMMATURE GRANULOCYTES 0 0.0 - 0.5 %    ABS. NEUTROPHILS 5.2 1.8 - 8.0 K/UL    ABS. LYMPHOCYTES 1.9 0.8 - 3.5 K/UL    ABS. MONOCYTES 0.5 0.0 - 1.0 K/UL    ABS.  EOSINOPHILS 0.1 0.0 - 0.4 K/UL    ABS. BASOPHILS 0.0 0.0 - 0.1 K/UL    ABS. IMM. GRANS. 0.0 0.00 - 0.04 K/UL    DF AUTOMATED     METABOLIC PANEL, COMPREHENSIVE    Collection Time: 07/19/21  4:06 AM   Result Value Ref Range    Sodium 138 136 - 145 mmol/L    Potassium 3.7 3.5 - 5.1 mmol/L    Chloride 104 97 - 108 mmol/L    CO2 29 21 - 32 mmol/L    Anion gap 5 5 - 15 mmol/L    Glucose 96 65 - 100 mg/dL    BUN 17 6 - 20 MG/DL    Creatinine 0.82 0.55 - 1.02 MG/DL    BUN/Creatinine ratio 21 (H) 12 - 20      GFR est AA >60 >60 ml/min/1.73m2    GFR est non-AA >60 >60 ml/min/1.73m2    Calcium 8.2 (L) 8.5 - 10.1 MG/DL    Bilirubin, total 0.7 0.2 - 1.0 MG/DL    ALT (SGPT) 75 12 - 78 U/L    AST (SGOT) 112 (H) 15 - 37 U/L    Alk. phosphatase 98 45 - 117 U/L    Protein, total 6.9 6.4 - 8.2 g/dL    Albumin 3.2 (L) 3.5 - 5.0 g/dL    Globulin 3.7 2.0 - 4.0 g/dL    A-G Ratio 0.9 (L) 1.1 - 2.2     LIPASE    Collection Time: 07/19/21  4:06 AM   Result Value Ref Range    Lipase 2,116 (H) 73 - 393 U/L   URINALYSIS W/ REFLEX CULTURE    Collection Time: 07/19/21  4:06 AM    Specimen: Urine   Result Value Ref Range    Color YELLOW/STRAW      Appearance CLEAR CLEAR      Specific gravity >1.030 (H) 1.003 - 1.030    pH (UA) 6.0 5.0 - 8.0      Protein Negative NEG mg/dL    Glucose Negative NEG mg/dL    Ketone Negative NEG mg/dL    Bilirubin Negative NEG      Blood TRACE (A) NEG      Urobilinogen 1.0 0.2 - 1.0 EU/dL    Nitrites Negative NEG      Leukocyte Esterase TRACE (A) NEG      UA:UC IF INDICATED URINE CULTURE ORDERED (A) CNI      WBC 20-50 0 - 4 /hpf    RBC 5-10 0 - 5 /hpf    Epithelial cells MODERATE (A) FEW /lpf    Bacteria Negative NEG /hpf    Hyaline cast 0-2 0 - 5 /lpf   LIPASE    Collection Time: 07/19/21  6:58 AM   Result Value Ref Range    Lipase >3,000 (H) 73 - 393 U/L     IMAGING RESULTS:   [x]      I have personally reviewed the actual   []    CXR  [x]    CT  []     US    Assessment/Plan:      Active Problems:    Acute pancreatitis (7/19/2021)    1. Acute idiopathic pancreatitis (meets 2/3 clinical criteria based on sx and Lipase >3x ULN even though CT unremarkable); clinically mild  2. S/p cholecystectomy, denies Etoh, no real new medications other than Norco  3.  Elevated ALT  ___________________________________________________  RECOMMENDATIONS:    - sips of CLD  - LR  - MRI/MRCP for further evaluation to r/o choledocholithiasis or other etiology of acute pancreatitis)    ___________________________________________________  Care Plan discussed with:    [x]    Patient   []    Family   []    Nursing   [x]    Attending   ___________________________________________________  GI: Aj Gallegos MD

## 2021-07-19 NOTE — ROUTINE PROCESS
Attempted Patients MRI  Claustrophobic and unable to do scan  No medication ordered  Will try again Tuesday

## 2021-07-19 NOTE — CONSULTS
Has right robles fracture non displaced  Treated at [facility] with boot and elevation  Was planning to see DR Debra Nieves    Patient Vitals for the past 12 hrs:   Temp Pulse Resp BP SpO2   07/19/21 1100 98 °F (36.7 °C) 78 16 107/68 99 %   07/19/21 0800  75 9 121/84 99 %   07/19/21 0715 97.5 °F (36.4 °C) 73 12 126/78 99 %   07/19/21 0430  76 9 120/79 99 %   07/19/21 0402 97.6 °F (36.4 °C) 80 18 132/85 100 %     Right foot with well placed cam boot  Toes warm well perfused    reviewed images with radiologist  Josue kemp  NWB  Follow up with  [facility] as discussed after DC

## 2021-07-19 NOTE — PROGRESS NOTES
-Please complete MRI History and Safety Screening Form   - Patient cannot be scanned until this form is completed and reviewed in MRI to ensure patient is SAFE and eligible for MRI. - CALL MRI when this has been successfully completed at [phone].

## 2021-07-20 ENCOUNTER — APPOINTMENT (OUTPATIENT)
Dept: MRI IMAGING | Age: 51
DRG: 440 | End: 2021-07-20
Attending: INTERNAL MEDICINE
Payer: COMMERCIAL

## 2021-07-20 LAB
ALBUMIN SERPL-MCNC: 2.8 G/DL (ref 3.5–5)
ALBUMIN/GLOB SERPL: 0.9 {RATIO} (ref 1.1–2.2)
ALP SERPL-CCNC: 96 U/L (ref 45–117)
ALT SERPL-CCNC: 159 U/L (ref 12–78)
ANION GAP SERPL CALC-SCNC: 4 MMOL/L (ref 5–15)
AST SERPL-CCNC: 117 U/L (ref 15–37)
BACTERIA SPEC CULT: NORMAL
BASOPHILS # BLD: 0 K/UL (ref 0–0.1)
BASOPHILS NFR BLD: 1 % (ref 0–1)
BILIRUB SERPL-MCNC: 0.7 MG/DL (ref 0.2–1)
BUN SERPL-MCNC: 9 MG/DL (ref 6–20)
BUN/CREAT SERPL: 13 (ref 12–20)
CALCIUM SERPL-MCNC: 8.4 MG/DL (ref 8.5–10.1)
CHLORIDE SERPL-SCNC: 110 MMOL/L (ref 97–108)
CO2 SERPL-SCNC: 28 MMOL/L (ref 21–32)
CREAT SERPL-MCNC: 0.69 MG/DL (ref 0.55–1.02)
DIFFERENTIAL METHOD BLD: NORMAL
EOSINOPHIL # BLD: 0.1 K/UL (ref 0–0.4)
EOSINOPHIL NFR BLD: 2 % (ref 0–7)
ERYTHROCYTE [DISTWIDTH] IN BLOOD BY AUTOMATED COUNT: 12.5 % (ref 11.5–14.5)
GLOBULIN SER CALC-MCNC: 3 G/DL (ref 2–4)
GLUCOSE SERPL-MCNC: 73 MG/DL (ref 65–100)
HCT VFR BLD AUTO: 36.6 % (ref 35–47)
HGB BLD-MCNC: 11.5 G/DL (ref 11.5–16)
IMM GRANULOCYTES # BLD AUTO: 0 K/UL (ref 0–0.04)
IMM GRANULOCYTES NFR BLD AUTO: 0 % (ref 0–0.5)
LIPASE SERPL-CCNC: 164 U/L (ref 73–393)
LYMPHOCYTES # BLD: 1.7 K/UL (ref 0.8–3.5)
LYMPHOCYTES NFR BLD: 43 % (ref 12–49)
MCH RBC QN AUTO: 28.9 PG (ref 26–34)
MCHC RBC AUTO-ENTMCNC: 31.4 G/DL (ref 30–36.5)
MCV RBC AUTO: 92 FL (ref 80–99)
MONOCYTES # BLD: 0.5 K/UL (ref 0–1)
MONOCYTES NFR BLD: 11 % (ref 5–13)
NEUTS SEG # BLD: 1.8 K/UL (ref 1.8–8)
NEUTS SEG NFR BLD: 43 % (ref 32–75)
NRBC # BLD: 0 K/UL (ref 0–0.01)
NRBC BLD-RTO: 0 PER 100 WBC
PLATELET # BLD AUTO: 168 K/UL (ref 150–400)
PMV BLD AUTO: 10.6 FL (ref 8.9–12.9)
POTASSIUM SERPL-SCNC: 3.8 MMOL/L (ref 3.5–5.1)
PROT SERPL-MCNC: 5.8 G/DL (ref 6.4–8.2)
RBC # BLD AUTO: 3.98 M/UL (ref 3.8–5.2)
SERVICE CMNT-IMP: NORMAL
SODIUM SERPL-SCNC: 142 MMOL/L (ref 136–145)
TRIGL SERPL-MCNC: 71 MG/DL (ref ?–150)
WBC # BLD AUTO: 4.1 K/UL (ref 3.6–11)

## 2021-07-20 PROCEDURE — A9585 GADOBUTROL INJECTION: HCPCS | Performed by: INTERNAL MEDICINE

## 2021-07-20 PROCEDURE — 85025 COMPLETE CBC W/AUTO DIFF WBC: CPT

## 2021-07-20 PROCEDURE — 94760 N-INVAS EAR/PLS OXIMETRY 1: CPT

## 2021-07-20 PROCEDURE — 74011250636 HC RX REV CODE- 250/636: Performed by: INTERNAL MEDICINE

## 2021-07-20 PROCEDURE — 65270000029 HC RM PRIVATE

## 2021-07-20 PROCEDURE — 74183 MRI ABD W/O CNTR FLWD CNTR: CPT

## 2021-07-20 PROCEDURE — 74011250637 HC RX REV CODE- 250/637: Performed by: INTERNAL MEDICINE

## 2021-07-20 PROCEDURE — 83690 ASSAY OF LIPASE: CPT

## 2021-07-20 PROCEDURE — 80053 COMPREHEN METABOLIC PANEL: CPT

## 2021-07-20 PROCEDURE — 36415 COLL VENOUS BLD VENIPUNCTURE: CPT

## 2021-07-20 PROCEDURE — 84478 ASSAY OF TRIGLYCERIDES: CPT

## 2021-07-20 RX ORDER — LORAZEPAM 2 MG/ML
1 INJECTION INTRAMUSCULAR ONCE
Status: DISCONTINUED | OUTPATIENT
Start: 2021-07-20 | End: 2021-07-20

## 2021-07-20 RX ORDER — LORAZEPAM 2 MG/ML
1 INJECTION INTRAMUSCULAR
Status: COMPLETED | OUTPATIENT
Start: 2021-07-20 | End: 2021-07-20

## 2021-07-20 RX ADMIN — BUPROPION HYDROCHLORIDE 100 MG: 100 TABLET, EXTENDED RELEASE ORAL at 08:45

## 2021-07-20 RX ADMIN — BUPROPION HYDROCHLORIDE 100 MG: 100 TABLET, EXTENDED RELEASE ORAL at 17:14

## 2021-07-20 RX ADMIN — SODIUM CHLORIDE, POTASSIUM CHLORIDE, SODIUM LACTATE AND CALCIUM CHLORIDE 150 ML/HR: 600; 310; 30; 20 INJECTION, SOLUTION INTRAVENOUS at 13:54

## 2021-07-20 RX ADMIN — PANTOPRAZOLE SODIUM 40 MG: 40 TABLET, DELAYED RELEASE ORAL at 06:40

## 2021-07-20 RX ADMIN — ACETAMINOPHEN 650 MG: 325 TABLET ORAL at 08:48

## 2021-07-20 RX ADMIN — ENOXAPARIN SODIUM 40 MG: 40 INJECTION SUBCUTANEOUS at 08:45

## 2021-07-20 RX ADMIN — LORAZEPAM 1 MG: 2 INJECTION INTRAMUSCULAR; INTRAVENOUS at 14:29

## 2021-07-20 RX ADMIN — Medication 10 ML: at 14:32

## 2021-07-20 RX ADMIN — Medication 10 ML: at 22:38

## 2021-07-20 RX ADMIN — CITALOPRAM HYDROBROMIDE 40 MG: 20 TABLET ORAL at 08:45

## 2021-07-20 RX ADMIN — SODIUM CHLORIDE, POTASSIUM CHLORIDE, SODIUM LACTATE AND CALCIUM CHLORIDE 150 ML/HR: 600; 310; 30; 20 INJECTION, SOLUTION INTRAVENOUS at 22:38

## 2021-07-20 RX ADMIN — GADOBUTROL 8 ML: 604.72 INJECTION INTRAVENOUS at 15:39

## 2021-07-20 RX ADMIN — Medication 10 ML: at 06:41

## 2021-07-20 NOTE — PROGRESS NOTES
Bedside and Verbal shift change report given to Chase Schaffer (oncoming nurse) by Daryle Font, RN (offgoing nurse). Report included the following information SBAR, Kardex, Intake/Output, MAR and Recent Results.

## 2021-07-20 NOTE — INTERDISCIPLINARY ROUNDS
Interdisciplinary Rounds were completed on 07/20/21 for this patient. Rounds included nursing, clinical care leader, pharmacy, and case management. Plan of care discussed. See clinical pathway and/or care plan for interventions and desired outcomes.

## 2021-07-20 NOTE — PROGRESS NOTES
End of Shift Note    Bedside shift change report given to Onel Casanova RN (oncoming nurse) by Luis Eduardo Crawley (offgoing nurse). Report included the following information SBAR, Kardex, Procedure Summary, Intake/Output and MAR    Shift worked:  7a-7p     Shift summary and any significant changes:     Patient went for MRI this shift, results pending. Ativan given prior to MRI and patient tolerated without issue. PRN pain medication x 1 this shift. Patient still on NPO diet with sips of clears and ice. Up to bedside commode this shift with assist x 1. Passing flatus but no BM, voiding without issues. Walking boot in place to RLE. Concerns for physician to address:       Zone phone for oncoming shift:   [phone]         Activity:  Activity Level: Up with Assistance  Number times ambulated in hallways past shift: 0  Number of times OOB to chair past shift: 0    Cardiac:   Cardiac Monitoring: No           Access:   Current line(s): PIV     Genitourinary:   Urinary status: voiding    Respiratory:   O2 Device: None (Room air)  Chronic home O2 use?: NO  Incentive spirometer at bedside: YES     GI:  Last Bowel Movement Date: 07/16/21  Current diet:  DIET NPO Sips of Water with Meds, Sips of Clear Liquids  Passing flatus: YES  Tolerating current diet: NO       Pain Management:   Patient states pain is manageable on current regimen: YES    Skin:  Maximino Score: 20  Interventions: increase time out of bed    Patient Safety:  Fall Score:  Total Score: 4  Interventions: gripper socks and pt to call before getting OOB  High Fall Risk: Yes    Length of Stay:  Expected LOS: 2d 9h  Actual LOS: [address] [address]

## 2021-07-20 NOTE — PROGRESS NOTES
Hospitalist Progress Note    NAME: Bhumi Barbosa   :  1970   MRN:  [id number]       Assessment / Plan:  Acute pancreatitis  Remote history of cholecystectomy  Acute transaminitis  -CT abdomen shows no CBD dilation.  -Transaminases are elevated and ALT and AST are 159 and 117. Total bilirubin is 0.7.  -MRCP today to establish etiology of pancreatitis  -Keep NPO. Continue IV fluids. -GI is following  -Check CMP in a.m.  -Triglycerides are within normal limits. Does not report any alcoholism. Right foot fracture 2021  Mechanical fall, twisted foot and ankle  Seen at urgent care, x-rays with fractures of the foot bones  Currently in a brace and pain medications  Evaluated by orthopedics here and recommended nonweightbearing, brace and outpatient follow-up with Dr. Vijay David upon discharge     Depression/anxiety  Continue Wellbutrin and Celexa     Hyperlipidemia  Hold statin with elevated LFTs     GERD  Continue Protonix      Obesity POA Body mass index is 31.89 kg/m².     DVT prophylaxis with lovenox     Code status: full code            Body mass index is 31.89 kg/m². Subjective:     Chief Complaint / Reason for Physician Visit  Abdominal pain is better. Mild nausea. No vomiting. No fever. Review of Systems:  Symptom Y/N Comments  Symptom Y/N Comments   Fever/Chills    Chest Pain     Poor Appetite    Edema     Cough    Abdominal Pain     Sputum    Joint Pain     SOB/AVILA    Pruritis/Rash     Nausea/vomit    Tolerating PT/OT     Diarrhea    Tolerating Diet     Constipation    Other       Could NOT obtain due to:      Objective:     VITALS:   Last 24hrs VS reviewed since prior progress note.  Most recent are:  Patient Vitals for the past 24 hrs:   Temp Pulse Resp BP SpO2   21 1130 98.1 °F (36.7 °C) 83 16 129/86 96 %   21 0826 98.4 °F (36.9 °C) 85 16 126/69 96 %   21 0318 98.3 °F (36.8 °C) 70 16 (!) 111/95 95 %   21 2240 98.5 °F (36.9 °C) 74 15 115/71 100 % 07/19/21 2017 98.9 °F (37.2 °C) 73 14 120/68 100 %   07/19/21 1528 98.1 °F (36.7 °C) 81 16 131/65 94 %       Intake/Output Summary (Last 24 hours) at 7/20/2021 1504  Last data filed at 7/20/2021 0827  Gross per 24 hour   Intake 2752.5 ml   Output 350 ml   Net 2402.5 ml        PHYSICAL EXAM:  General: Alert, cooperative, no acute distress    EENT:  EOMI. Anicteric sclerae. MMM  Resp:  CTA bilaterally, no wheezing or rales. No accessory muscle use  CV:  Regular  rhythm,  No edema  GI:  Soft, mild epigastric tenderness, no guarding or rigidity  Neurologic:  Alert and oriented X 3, normal speech,   Psych:   Not anxious nor agitated  Skin:  No rashes.   No jaundice    Reviewed most current lab test results and cultures  YES  Reviewed most current radiology test results   YES  Review and summation of old records today    NO  Reviewed patient's current orders and MAR    YES  PMH/SH reviewed - no change compared to H&P          Current Facility-Administered Medications:     sodium chloride (NS) flush 5-40 mL, 5-40 mL, IntraVENous, Q8H, Cameron Elias MD, 10 mL at 07/20/21 1432    sodium chloride (NS) flush 5-40 mL, 5-40 mL, IntraVENous, PRN, Cameron Elias MD    acetaminophen (TYLENOL) tablet 650 mg, 650 mg, Oral, Q6H PRN, 650 mg at 07/20/21 0848 **OR** acetaminophen (TYLENOL) suppository 650 mg, 650 mg, Rectal, Q6H PRN, Cameron Elias MD    bisacodyL (DULCOLAX) tablet 5 mg, 5 mg, Oral, DAILY PRN, Cameron Elias MD    promethazine (PHENERGAN) tablet 12.5 mg, 12.5 mg, Oral, Q6H PRN, 12.5 mg at 07/19/21 2238 **OR** ondansetron (ZOFRAN) injection 4 mg, 4 mg, IntraVENous, Q6H PRN, Cameron Elias MD    enoxaparin (LOVENOX) injection 40 mg, 40 mg, SubCUTAneous, DAILY, Cameron Elias MD, 40 mg at 07/20/21 0845    lactated Ringers infusion, 150 mL/hr, IntraVENous, CONTINUOUS, Cameron Elias MD, Last Rate: 150 mL/hr at 07/20/21 1354, 150 mL/hr at 07/20/21 1354   HYDROmorphone (DILAUDID) injection 0.5 mg, 0.5 mg, IntraVENous, Q3H PRN, Glen Elias MD    pantoprazole (PROTONIX) tablet 40 mg, 40 mg, Oral, ACB, Glen Elias MD, 40 mg at 07/20/21 0640    citalopram (CELEXA) tablet 40 mg, 40 mg, Oral, DAILY, Glen Elias MD, 40 mg at 07/20/21 0845    bu[facility]ion SR [facility]) tablet 100 mg, 100 mg, Oral, BID, Glen Elias MD, 100 mg at 07/20/21 0845  ________________________________________________________________________  Care Plan discussed with:    Comments   Patient y    Family      RN y    Care Manager     Consultant                        Multidiciplinary team rounds were held today with , nursing, pharmacist and clinical coordinator. Patient's plan of care was discussed; medications were reviewed and discharge planning was addressed. ________________________________________________________________________  Total NON critical care TIME:  35    Minutes    Total CRITICAL CARE TIME Spent:   Minutes non procedure based      Comments   >50% of visit spent in counseling and coordination of care     ________________________________________________________________________  George Barbosa MD     Procedures: see electronic medical records for all procedures/Xrays and details which were not copied into this note but were reviewed prior to creation of Plan. LABS:  I reviewed today's most current labs and imaging studies.   Pertinent labs include:  Recent Labs     07/20/21 0322 07/19/21  0406   WBC 4.1 7.8   HGB 11.5 13.1   HCT 36.6 41.3    199     Recent Labs     07/20/21 0322 07/19/21  0406    138   K 3.8 3.7   * 104   CO2 28 29   GLU 73 96   BUN 9 17   CREA 0.69 0.82   CA 8.4* 8.2*   ALB 2.8* 3.2*   TBILI 0.7 0.7   * 75       Signed: George Barbosa MD

## 2021-07-20 NOTE — PROGRESS NOTES
MRI nurse called and said they would be calling to take patient for MRI today around 1500. MRI to call this nurse prior to transporting around 1430 so she can get Ativan IV prior to procedure. See Ativan order in MAR.

## 2021-07-20 NOTE — PROGRESS NOTES
Problem: Falls - Risk of  Goal: *Absence of Falls  Description: Document Monica Abreu Fall Risk and appropriate interventions in the flowsheet.   Outcome: Progressing Towards Goal  Note: Fall Risk Interventions:  Mobility Interventions: Communicate number of staff needed for ambulation/transfer, Patient to call before getting OOB         Medication Interventions: Patient to call before getting OOB, Teach patient to arise slowly    Elimination Interventions: Call light in reach, Patient to call for help with toileting needs    History of Falls Interventions: Door open when patient unattended, Vital signs minimum Q4HRs X 24 hrs (comment for end date)         Problem: Patient Education: Go to Patient Education Activity  Goal: Patient/Family Education  Outcome: Progressing Towards Goal     Problem: Patient Education: Go to Patient Education Activity  Goal: Patient/Family Education  Outcome: Progressing Towards Goal

## 2021-07-20 NOTE — PROGRESS NOTES
GI PROGRESS NOTE  Eliud Campbell NP  [phone] NP in-hospital cell phone M-F until 4:30  After 5pm or on weekends, please call  for physician on call    [address] JIM:[id number]   ATTG: Dr. Guilherme Van   PCP: Antoni Wilson NP  Date/Time:  7/20/2021 9:19 AM     Primary GI: Dr. Chaney July    Reason for following: acute pancreatitis    Assessment:   -Acute pancreatitis, first episode  · Lipase >3k, CT showing no acute intra-abdominal pathology  · Triglycerides WNL, No ETOH, no new medications, s/p choley  · Pending MRI/MRCP r/o biliary etiology  · Liver enzymes mildly elevated    GI Hx  -6/2020 EGD showing HH, gastritis  -6/2020 CLN internal hemorrhoids, otherwise normal     Plan:   · Continue clear liquids for now, in case needs intervention post MRI/MRCP  · MRI/MRCP pending  · Continue LR for now, can drop rate down a bit  · Will continue to follow. Please call GI with any further questions or concerns. Thank you! *Plan discussed with Dr. Chaney July  Subjective:   Discussed with RN events overnight, patient having more foot pain. Patient concerned about constipation with pain medications, currently on dulcolax. Patient reports abdominal pain has resolved. No nausea or vomiting. Tolerating clear liquids. Still having significant foot pain. Complaint Y/N Description   Abdominal Pain N    Hematemesis     Hematochezia     Melena     Constipation     Diarrhea     Dyspepsia     Dysphagia     Jaundiced     Nausea/vomiting N      Review of Systems:  Symptom Y/N Comments  Symptom Y/N Comments   Fever/Chills    Chest Pain     Cough    Headaches     Sputum    Joint Pain     SOB/AVILA    Pruritis/Rash     Tolerating Diet Y clears  Other       Could NOT obtain due to:      Objective:   VITALS:   Last 24hrs VS reviewed since prior progress note.  Most recent are:  Visit Vitals  /69   Pulse 85   Temp 98.4 °F (36.9 °C)   Resp 16   Ht 5' 3\" (1.6 m)   Wt 81.6 kg (180 lb)   SpO2 96%   BMI 31.89 kg/m²       Intake/Output Summary (Last 24 hours) at 7/20/2021 0919  Last data filed at 7/20/2021 0827  Gross per 24 hour   Intake 2752.5 ml   Output 350 ml   Net 2402.5 ml     PHYSICAL EXAM:  General: WD, WN. Alert, cooperative, no acute distress    HEENT: NC, Atraumatic. Anicteric sclerae. Lungs:  CTA Bilaterally. No Wheezing/Rhonchi/Rales. Heart:  Regular  rhythm,  No murmur (), No Rubs, No Gallops  Abdomen: Soft, Non distended, Non tender.  +Bowel sounds, no HSM  Extremities: No c/c/e  Neurologic:  Alert and oriented X 3. No acute neurological distress   Psych:   Good insight. Not anxious nor agitated. Lab and Radiology Data Reviewed: (see below)    Medications Reviewed: (see below)  PMH/SH reviewed - no change compared to H&P  ________________________________________________________________________  Total time spent with patient: 20 minutes ________________________________________________________________________  Care Plan discussed with:  Patient Y   Family     RN Y              Consultant:       Inez Springer NP     Procedures: see electronic medical records for all procedures/Xrays and details which were not copied into this note but were reviewed prior to creation of Plan. LABS:  Recent Labs     07/20/21 0322 07/19/21  0406   WBC 4.1 7.8   HGB 11.5 13.1   HCT 36.6 41.3    199     Recent Labs     07/20/21 0322 07/19/21  0406    138   K 3.8 3.7   * 104   CO2 28 29   BUN 9 17   CREA 0.69 0.82   GLU 73 96   CA 8.4* 8.2*     Recent Labs     07/20/21 0322 07/19/21  0658 07/19/21  0406   AP 96  --  98   TP 5.8*  --  6.9   ALB 2.8*  --  3.2*   GLOB 3.0  --  3.7   LPSE 164 >3,000* 2,116*     No results for input(s): INR, PTP, APTT, INREXT in the last 72 hours. No results for input(s): FE, TIBC, PSAT, FERR in the last 72 hours. No results found for: FOL, RBCF  No results for input(s): PH, PCO2, PO2 in the last 72 hours.   No results for input(s): CPK, CKMB in the last 72 hours.    No lab exists for component: TROPONINI  Lab Results   Component Value Date/Time    Color YELLOW/STRAW 07/19/2021 04:06 AM    Appearance CLEAR 07/19/2021 04:06 AM    Specific gravity >1.030 (H) 07/19/2021 04:06 AM    pH (UA) 6.0 07/19/2021 04:06 AM    Protein Negative 07/19/2021 04:06 AM    Glucose Negative 07/19/2021 04:06 AM    Ketone Negative 07/19/2021 04:06 AM    Bilirubin Negative 07/19/2021 04:06 AM    Urobilinogen 1.0 07/19/2021 04:06 AM    Nitrites Negative 07/19/2021 04:06 AM    Leukocyte Esterase TRACE (A) 07/19/2021 04:06 AM    Epithelial cells MODERATE (A) 07/19/2021 04:06 AM    Bacteria Negative 07/19/2021 04:06 AM    WBC 20-50 07/19/2021 04:06 AM    RBC 5-10 07/19/2021 04:06 AM       MEDICATIONS:  Current Facility-Administered Medications   Medication Dose Route Frequency    LORazepam (ATIVAN) injection 1 mg  1 mg IntraVENous ONCE PRN    sodium chloride (NS) flush 5-40 mL  5-40 mL IntraVENous Q8H    sodium chloride (NS) flush 5-40 mL  5-40 mL IntraVENous PRN    acetaminophen (TYLENOL) tablet 650 mg  650 mg Oral Q6H PRN    Or    acetaminophen (TYLENOL) suppository 650 mg  650 mg Rectal Q6H PRN    bisacodyL (DULCOLAX) tablet 5 mg  5 mg Oral DAILY PRN    promethazine (PHENERGAN) tablet 12.5 mg  12.5 mg Oral Q6H PRN    Or    ondansetron (ZOFRAN) injection 4 mg  4 mg IntraVENous Q6H PRN    enoxaparin (LOVENOX) injection 40 mg  40 mg SubCUTAneous DAILY    lactated Ringers infusion  150 mL/hr IntraVENous CONTINUOUS    HYDROmorphone (DILAUDID) injection 0.5 mg  0.5 mg IntraVENous Q3H PRN    pantoprazole (PROTONIX) tablet 40 mg  40 mg Oral ACB    citalopram (CELEXA) tablet 40 mg  40 mg Oral DAILY    buPROPion SR (WELLBUTRIN SR) tablet 100 mg  100 mg Oral BID

## 2021-07-21 VITALS
BODY MASS INDEX: 31.89 KG/M2 | OXYGEN SATURATION: 99 % | HEART RATE: 91 BPM | HEIGHT: 63 IN | DIASTOLIC BLOOD PRESSURE: 89 MMHG | RESPIRATION RATE: 18 BRPM | SYSTOLIC BLOOD PRESSURE: 132 MMHG | TEMPERATURE: 98 F | WEIGHT: 180 LBS

## 2021-07-21 LAB
ALBUMIN SERPL-MCNC: 2.8 G/DL (ref 3.5–5)
ALBUMIN/GLOB SERPL: 0.9 {RATIO} (ref 1.1–2.2)
ALP SERPL-CCNC: 88 U/L (ref 45–117)
ALT SERPL-CCNC: 110 U/L (ref 12–78)
ANION GAP SERPL CALC-SCNC: 8 MMOL/L (ref 5–15)
AST SERPL-CCNC: 57 U/L (ref 15–37)
BILIRUB SERPL-MCNC: 1.2 MG/DL (ref 0.2–1)
BUN SERPL-MCNC: 9 MG/DL (ref 6–20)
BUN/CREAT SERPL: 14 (ref 12–20)
CALCIUM SERPL-MCNC: 8.4 MG/DL (ref 8.5–10.1)
CHLORIDE SERPL-SCNC: 106 MMOL/L (ref 97–108)
CO2 SERPL-SCNC: 26 MMOL/L (ref 21–32)
CREAT SERPL-MCNC: 0.66 MG/DL (ref 0.55–1.02)
ERYTHROCYTE [DISTWIDTH] IN BLOOD BY AUTOMATED COUNT: 12.5 % (ref 11.5–14.5)
GLOBULIN SER CALC-MCNC: 3.1 G/DL (ref 2–4)
GLUCOSE SERPL-MCNC: 74 MG/DL (ref 65–100)
HCT VFR BLD AUTO: 35 % (ref 35–47)
HGB BLD-MCNC: 11.6 G/DL (ref 11.5–16)
INR PPP: 1 (ref 0.9–1.1)
MCH RBC QN AUTO: 29.4 PG (ref 26–34)
MCHC RBC AUTO-ENTMCNC: 33.1 G/DL (ref 30–36.5)
MCV RBC AUTO: 88.8 FL (ref 80–99)
NRBC # BLD: 0 K/UL (ref 0–0.01)
NRBC BLD-RTO: 0 PER 100 WBC
PLATELET # BLD AUTO: 164 K/UL (ref 150–400)
PMV BLD AUTO: 10.7 FL (ref 8.9–12.9)
POTASSIUM SERPL-SCNC: 3.5 MMOL/L (ref 3.5–5.1)
PROT SERPL-MCNC: 5.9 G/DL (ref 6.4–8.2)
PROTHROMBIN TIME: 10.8 SEC (ref 9–11.1)
RBC # BLD AUTO: 3.94 M/UL (ref 3.8–5.2)
SODIUM SERPL-SCNC: 140 MMOL/L (ref 136–145)
WBC # BLD AUTO: 3.9 K/UL (ref 3.6–11)

## 2021-07-21 PROCEDURE — 94760 N-INVAS EAR/PLS OXIMETRY 1: CPT

## 2021-07-21 PROCEDURE — 80053 COMPREHEN METABOLIC PANEL: CPT

## 2021-07-21 PROCEDURE — 74011250637 HC RX REV CODE- 250/637: Performed by: INTERNAL MEDICINE

## 2021-07-21 PROCEDURE — 36415 COLL VENOUS BLD VENIPUNCTURE: CPT

## 2021-07-21 PROCEDURE — 97116 GAIT TRAINING THERAPY: CPT

## 2021-07-21 PROCEDURE — 97530 THERAPEUTIC ACTIVITIES: CPT

## 2021-07-21 PROCEDURE — 74011250636 HC RX REV CODE- 250/636: Performed by: INTERNAL MEDICINE

## 2021-07-21 PROCEDURE — 85610 PROTHROMBIN TIME: CPT

## 2021-07-21 PROCEDURE — 85027 COMPLETE CBC AUTOMATED: CPT

## 2021-07-21 RX ORDER — ATORVASTATIN CALCIUM 10 MG/1
10 TABLET, FILM COATED ORAL DAILY
Qty: 30 TABLET | Refills: 0 | Status: SHIPPED | OUTPATIENT
Start: 2021-07-28 | End: 2021-08-27

## 2021-07-21 RX ADMIN — PANTOPRAZOLE SODIUM 40 MG: 40 TABLET, DELAYED RELEASE ORAL at 08:11

## 2021-07-21 RX ADMIN — BUPROPION HYDROCHLORIDE 100 MG: 100 TABLET, EXTENDED RELEASE ORAL at 08:11

## 2021-07-21 RX ADMIN — ENOXAPARIN SODIUM 40 MG: 40 INJECTION SUBCUTANEOUS at 08:11

## 2021-07-21 RX ADMIN — Medication 10 ML: at 06:00

## 2021-07-21 RX ADMIN — Medication 10 ML: at 14:00

## 2021-07-21 RX ADMIN — CITALOPRAM HYDROBROMIDE 40 MG: 20 TABLET ORAL at 08:11

## 2021-07-21 NOTE — DISCHARGE SUMMARY
Hospitalist Discharge Summary     Patient ID:  Sue Boss  [id number]  68 y.o.  1970 7/19/2021    PCP on record: Garrett Williamson NP    Admit date: 7/19/2021  Discharge date and time: 7/21/2021    DISCHARGE DIAGNOSIS:    Acute pancreatitis  Remote history of cholecystectomy  Acute transaminitis  Right foot fracture 7/17/2021  Depression/anxiety  Hyperlipidemia  GERD  Obesity POA      CONSULTATIONS:  IP CONSULT TO GASTROENTEROLOGY  IP CONSULT TO ORTHOPEDIC SURGERY    Excerpted HPI from H&P of Rich Rock MD:  49-year-old female     History of cholecystectomy 19 years ago after the birth of one of her children  Hyperlipidemia on Lipitor  GERD on Protonix  Depression anxiety on Wellbutrin and Celexa     Mayelin [place] on 7/17/2021, lost her balance and twisted her right ankle and foot  Pain in the right foot afterwards  Went to a walk-in clinic and was diagnosed with a foot fracture  Placed in a brace and given pain medications with hydrocodone  Pain not well controlled, started taking Motrin past 1 to 2 days  Was told to follow-up with an orthopedic foot surgeon     Notes episodes of periumbilical and epigastric abdominal pain in the past  He describes them as \"gallbladder attacks\" even after her gallbladder was out  Usually just takes Gas-X or has a bowel movement and they would resolve  She never sought medical attention for them     Woke up this morning around 2 AM with increasing abdominal pain  Pain was bandlike pain across her.   Umbilical area and in the epigastric area  Progressively severe, 9/10 at its worst              Aching pain that would \"come in waves\"  Associated with nausea without vomiting  Pain similar to her attacks described above, but more severe and did not improve  She came to the emergency room  No other complaints    ______________________________________________________________________  DISCHARGE SUMMARY/HOSPITAL COURSE:  for full details see H&P, daily progress notes, labs, consult notes. Hospital course problem wise:      Acute pancreatitis  Remote history of cholecystectomy  Acute transaminitis  -Patient was admitted to the hospital and underwent management as follows. CT abdomen shows no CBD dilation. Transaminases are elevated and ALT and AST are 159 and 117. Total bilirubin is 0.7.  -MRCP showed no evidence of gallstones. Patient was evaluated by GI. Triglycerides were normal.  She does not report any alcoholism. She was kept n.p.o. and was given IV fluids. With above treatment, she improved. GI cleared the patient for discharge for outpatient follow-up in about 3 weeks for further work-up of pancreatitis.       Right foot fracture 7/17/2021  Mechanical fall, twisted foot and ankle  Seen at urgent care, x-rays with fractures of the foot bones  Currently in a brace and pain medications  Evaluated by orthopedics here and recommended nonweightbearing, brace and outpatient follow-up with Dr. Shelli Sifuentes  upon discharge     Depression/anxiety  Continue Wellbutrin and Celexa     Hyperlipidemia  Resume statin in 1 week and follow-up with PCP     GERD  Continue Protonix      Obesity POA Body     Patient seen and examined today, vital signs are stable, lab work is at baseline and improving. I discussed with Dr. Rory Chua from GI who cleared the patient for discharge for outpatient follow-up. She was advised to have a low-fat diet.  _______________________________________________________________________  Patient seen and examined by me on discharge day. Pertinent Findings:  Gen:    Not in distress  Chest: Clear lungs  CVS:   Regular rhythm.   No edema  Abd:  Soft, not distended, not tender  Neuro:  Alert, awake  _______________________________________________________________________  DISCHARGE MEDICATIONS:   Current Discharge Medication List      CONTINUE these medications which have CHANGED    Details   atorvastatin (LIPITOR) 10 mg tablet Take 1 Tablet by mouth daily for 30 days.  Ivelisse Almeida: 30 Tablet, Refills: 0  Start date: 7/28/2021, End date: 8/27/2021         CONTINUE these medications which have NOT CHANGED    Details   buPROPion SR (WELLBUTRIN, ZYBAN) 200 mg SR tablet Take 200 mg by mouth daily. citalopram (CELEXA) 40 mg tablet Take 40 mg by mouth daily. Cetirizine (ZyrTEC) 10 mg cap Take 10 mg by mouth daily. pantoprazole (Protonix) 40 mg tablet Take 40 mg by mouth daily. ibuprofen (MOTRIN) 200 mg tablet Take 800 mg by mouth every eight (8) hours as needed for Pain.      simethicone (Gas-X Extra Strength) 125 mg chewable tablet Take 125 mg by mouth every six (6) hours as needed for Flatulence. STOP taking these medications       HYDROcodone-acetaminophen (NORCO) 5-325 mg per tablet Comments:   Reason for Stopping:                 Patient Follow Up Instructions:     1. Recommended diet: Full liquid for few days and advance as tolerated, Low fat    2. Recommended activity: Activity as tolerated    3. If you experience any of the following symptoms then please call your primary care physician or return to the emergency room if you cannot get hold of your doctor:    4. Wound Care: none    5. Lab work: cmp in 1 week         Follow-up Information     Follow up With Specialties Details Why Contact Info    Brandon Bill NP Nurse Practitioner   [address]      Ivonne Martinez MD Gastroenterology Schedule an appointment as soon as possible for a visit in 3 weeks  Kenneth Richardson [address]  [phone]       [facility] orthopedics in  tomorrow as scheduled.             ________________________________________________________________    Risk of deterioration: High    Condition at Discharge:  Stable  __________________________________________________________________    Disposition  Home with family, no needs    ____________________________________________________________________    Code Status: Full Code  ___________________________________________________________________      Total time in minutes spent coordinating this discharge (includes going over instructions, follow-up, prescriptions, and preparing report for sign off to her PCP) :  35  minutes    Signed:  Dulce Maria Downs MD

## 2021-07-21 NOTE — PROGRESS NOTES
Spiritual Care Partner Volunteer visited patient at [address] in [facility] on 7/21/2021   Documented by:    [id number] [facility] Ryan Valenzuela, M.S., Th.M.  Spiritual Care Provider   Paging Service [phone])

## 2021-07-21 NOTE — PROGRESS NOTES
Transition of Care Plan:     RUR: 6% LOW  Disposition: Home w/ f/u appts & family assistance  Follow up appointments: PCP, Ortho, GI- details in AVS  DME needed: Pt stated that she has crutches at home; also has a walking boot on right foot  Transportation at Discharge: Family, ETA 3:30 PM   [place] or means to access home:  Yes  IM Medicare letter: N/A   Caregiver Contact: Wilian Galvin, [phone]  Discharge Caregiver contacted prior to discharge? CM acknowledged d/c.  CM met w/ pt at bedside to review SANTOS plan for d/c re: home w/ f/u appts & family support. Pt verbalized understanding & is agreeable to d/c. CM secured PCP & specialist f/u appts; details in AVS. Pt reported she purchased a shower bench & a raised toilet seat. Pt identified no addtl questions or concerns for CM. Pt's family to transport at d/c. Pt ready to d/c from CM standpoint. RN notified. Care Management Interventions  PCP Verified by CM: Yes  Mode of Transport at Discharge:  Other (see comment) (Family)  Hospital Transport Time of Discharge: [address] (CM Consult): Discharge Planning  Discharge Durable Medical Equipment: No (Patient has a walking boot on as well as crutches at home)  Physical Therapy Consult: Yes  Occupational Therapy Consult: No  Speech Therapy Consult: No  Current Support Network: Own Home, Lives Alone  Confirm Follow Up Transport: Family  The Plan for Transition of Care is Related to the Following Treatment Goals : Home w/ f/u appts  The Patient and/or Patient Representative was Provided with a Choice of Provider and Agrees with the Discharge Plan?: Yes  Discharge Location  Discharge Placement: Home with family assistance (& f/u appts)    MELANY Lorenzo  Care Management

## 2021-07-21 NOTE — PROGRESS NOTES
Problem: Mobility Impaired (Adult and Pediatric)  Goal: *Acute Goals and Plan of Care (Insert Text)  Description: FUNCTIONAL STATUS PRIOR TO ADMISSION: Patient was independent and active without use of DME.    HOME SUPPORT PRIOR TO ADMISSION: The patient lived with her 2 teenage children but did not require assist.    Physical Therapy Goals  Initiated 7/19/2021  1. Patient will move from supine to sit and sit to supine  in bed with modified independence within 7 day(s). 2.  Patient will transfer from bed to chair and chair to bed with modified independence using the least restrictive device within 7 day(s). 3.  Patient will perform sit to stand with modified independence within 7 day(s). 4.  Patient will ambulate with modified independence for 100 feet with the least restrictive device within 7 day(s). 5.  Patient will ascend/descend 4 stairs with 1 handrail(s) with modified independence within 7 day(s). Outcome: Progressing Towards Goal   PHYSICAL THERAPY TREATMENT  Patient: Israel Barakat (64 y.o. female)  Date: 7/21/2021  Diagnosis: Acute pancreatitis [K85.90] <principal problem not specified>       Precautions:    Chart, physical therapy assessment, plan of care and goals were reviewed. ASSESSMENT  Patient continues with skilled PT services and is progressing towards goals. Pt presents with decreased strength and endurance. Pt performed bed mobility independently. Pt preformed sit to stand transfer at [facility] with cueing for hand placement using crutches. Pt ambulated 40ft with axiliary crutches at [facility] . Pt requiring cueing to flex RLE to improve balance. Pts stability improve with time. Pt reported feeling fatigued. Pt educated on endurance improving with time. Pt educated on equipment needed and how to maneuver stairs several different ways after seeing the orthopedic doctor.      Current Level of Function Impacting Discharge (mobility/balance): bed mobility independent, sit to stand transfer at [facility], Ambulation at [facility] with     Other factors to consider for discharge: none          PLAN :  Patient continues to benefit from skilled intervention to address the above impairments. Continue treatment per established plan of care. to address goals. Recommendation for discharge: (in order for the patient to meet his/her long term goals)  Physical therapy at least 2 days/week in the home vs none    This discharge recommendation:  Has been made in collaboration with the attending provider and/or case management    IF patient discharges home will need the following DME: patient owns DME required for discharge       SUBJECTIVE:   Patient stated  I go to the doctor tomorrow.     OBJECTIVE DATA SUMMARY:   Critical Behavior:              Functional Mobility Training:  Bed Mobility:     Supine to Sit: Supervision; Independent  Sit to Supine: Independent           Transfers:  Sit to Stand: Contact guard assistance;Stand-by assistance  Stand to Sit: Stand-by assistance                             Balance:  Sitting: Intact  Standing: Impaired  Standing - Static: Fair;Constant support  Standing - Dynamic : Fair;Constant support  Ambulation/Gait Training:  Distance (ft): 40 Feet (ft)  Assistive Device: Gait belt;Crutches  Ambulation - Level of Assistance: Contact guard assistance;Stand-by assistance        Gait Abnormalities: Decreased step clearance (hop to gait)  Right Side Weight Bearing: Non-weight bearing     Base of Support: Shift to left     Speed/Sharla: Slow  Step Length: Left shortened        Pain Rating:  Pt with no complaints of pain     Activity Tolerance:   Good and requires rest breaks    After treatment patient left in no apparent distress:   Supine in bed and Call bell within reach    COMMUNICATION/COLLABORATION:   The patients plan of care was discussed with: Registered nurse.      Gagan Pierce PTA   Time Calculation: 23 mins

## 2021-07-21 NOTE — INTERDISCIPLINARY ROUNDS
Interdisciplinary Rounds were completed on 07/21/21 for this patient. Rounds included nursing, clinical care leader, pharmacy, and case management. Plan of care discussed. See clinical pathway and/or care plan for interventions and desired outcomes.

## 2021-07-21 NOTE — PROGRESS NOTES
DISCHARGE NOTE FROM [facility] NURSE    Patient determined to be stable for discharge by attending provider. I have reviewed the discharge instructions and follow-up appointments with the patient and son. They verbalized understanding and all questions were answered to their satisfaction. No complaints or further questions were expressed. Medications sent to pharmacy. Appropriate educational materials and medication side effect teaching were provided. PIV were removed prior to discharge. Patient did not discharge with any line, lemus, or drain. All personal items collected during admission were returned to the patient prior to discharge.     Post-op patient: No      Beau Ellis RN

## 2021-07-21 NOTE — PROGRESS NOTES
GI Progress Note Macy Gonzalez)  [place] Annabella Rose Signs VVT:[id number][birth year]47[birth year]57 IOANA:[id number]   PCP: Vonnie Rodriguez NP  Date/Time:  7/21/2021 10:27 AM   Assessment:   · Mild idiopathic acute pancreatitis; resolved  · MRI/MRCP (personally reviewed) negative for pathology     Plan:   · CLD and ADAT  · Can be discharged from GI standpoint today    GI will sign off. Pt should follow up with myself in clinic in 3-4 weeks or so     Subjective:   Pt denies AP/n/v    Complaint Y/N Description   Abdominal Pain     Hematemesis     Hematochezia     Melena     Constipation     Diarrhea     Dyspepsia     Dysphagia     Jaundiced     Nausea/vomiting       Review of Systems:  Symptom Y/N Comments  Symptom Y/N Comments   Fever/Chills    Chest Pain     Cough    Headaches     Sputum    Joint Pain     SOB/AVILA    Pruritis/Rash     Tolerating Diet    Other       Could NOT obtain due to:      Objective:   VITALS:   Last 24hrs VS reviewed since prior progress note. Most recent are:  Visit Vitals  /76 (BP 1 Location: Right upper arm, BP Patient Position: Supine)   Pulse 76   Temp 98.4 °F (36.9 °C)   Resp 16   Ht 5' 3\" (1.6 m)   Wt 81.6 kg (180 lb)   SpO2 95%   BMI 31.89 kg/m²     No intake or output data in the 24 hours ending 07/21/21 1027  PHYSICAL EXAM:  General: WD, WN. Alert, cooperative, no acute distress    HEENT: NC, Atraumatic. PERRLA, EOMI. Anicteric sclerae. Lungs:  CTA Bilaterally. No Wheezing/Rhonchi/Rales.   Heart:  Regular  rhythm,  No murmur (), No Rubs, No Gallops  Abdomen: Soft, Non distended, Non tender.  +Bowel sounds, no HSM  Extremities: No c/c/e  Neurologic:  No acute neurological distress     Lab and Radiology Data Reviewed: (see below)    Medications Reviewed: (see below)  PMH/SH reviewed - no change compared to H&P  ________________________________________________________________________  Care Plan discussed with:  Patient x   Family     RN               Consultant:  robyn Ricketts MD     Procedures: see electronic medical records for all procedures/Xrays and details which were not copied into this note but were reviewed prior to creation of Plan. LABS:  Recent Labs     07/21/21 0423 07/20/21 0322   WBC 3.9 4.1   HGB 11.6 11.5   HCT 35.0 36.6    168     Recent Labs     07/21/21 0423 07/20/21 0322 07/19/21  0406    142 138   K 3.5 3.8 3.7    110* 104   CO2 26 28 29   BUN 9 9 17   CREA 0.66 0.69 0.82   GLU 74 73 96   CA 8.4* 8.4* 8.2*     Recent Labs     07/21/21 0423 07/20/21 0322 07/19/21  0658 07/19/21  0406   AP 88 96  --  98   TP 5.9* 5.8*  --  6.9   ALB 2.8* 2.8*  --  3.2*   GLOB 3.1 3.0  --  3.7   LPSE  --  164 >3,000* 2,116*     Recent Labs     07/21/21 0423   INR 1.0   PTP 10.8      No results for input(s): FE, TIBC, PSAT, FERR in the last 72 hours. No results found for: FOL, RBCF  No results for input(s): PH, PCO2, PO2 in the last 72 hours. No results for input(s): CPK, CKMB in the last 72 hours.     No lab exists for component: TROPONINI  Lab Results   Component Value Date/Time    Color YELLOW/STRAW 07/19/2021 04:06 AM    Appearance CLEAR 07/19/2021 04:06 AM    Specific gravity >1.030 (H) 07/19/2021 04:06 AM    pH (UA) 6.0 07/19/2021 04:06 AM    Protein Negative 07/19/2021 04:06 AM    Glucose Negative 07/19/2021 04:06 AM    Ketone Negative 07/19/2021 04:06 AM    Bilirubin Negative 07/19/2021 04:06 AM    Urobilinogen 1.0 07/19/2021 04:06 AM    Nitrites Negative 07/19/2021 04:06 AM    Leukocyte Esterase TRACE (A) 07/19/2021 04:06 AM    Epithelial cells MODERATE (A) 07/19/2021 04:06 AM    Bacteria Negative 07/19/2021 04:06 AM    WBC 20-50 07/19/2021 04:06 AM    RBC 5-10 07/19/2021 04:06 AM       MEDICATIONS:  Current Facility-Administered Medications   Medication Dose Route Frequency    sodium chloride (NS) flush 5-40 mL  5-40 mL IntraVENous Q8H    sodium chloride (NS) flush 5-40 mL  5-40 mL IntraVENous PRN    acetaminophen (TYLENOL) tablet 650 mg  650 mg Oral Q6H PRN    Or  acetaminophen (TYLENOL) suppository 650 mg  650 mg Rectal Q6H PRN    bisacodyL (DULCOLAX) tablet 5 mg  5 mg Oral DAILY PRN    promethazine (PHENERGAN) tablet 12.5 mg  12.5 mg Oral Q6H PRN    Or    ondansetron (ZOFRAN) injection 4 mg  4 mg IntraVENous Q6H PRN    enoxaparin (LOVENOX) injection 40 mg  40 mg SubCUTAneous DAILY    lactated Ringers infusion  75 mL/hr IntraVENous CONTINUOUS    HYDROmorphone (DILAUDID) injection 0.5 mg  0.5 mg IntraVENous Q3H PRN    pantoprazole (PROTONIX) tablet 40 mg  40 mg Oral ACB    citalopram (CELEXA) tablet 40 mg  40 mg Oral DAILY    buPROPion SR (WELLBUTRIN SR) tablet 100 mg  100 mg Oral BID

## 2021-07-21 NOTE — DISCHARGE INSTRUCTIONS
Patient Discharge Instructions    Jane Cadet / [id number] : 1970    Admitted 2021 Discharged: 2021         DISCHARGE DIAGNOSIS:   Acute pancreatitis  Remote history of cholecystectomy  Acute transaminitis  Right foot fracture 2021  Depression/anxiety  Hyperlipidemia  GERD   Obesity POA        Take Home Medications     {Medication reconciliation information is now added to the patient's AVS automatically when it is printed. There is no need to use this SmartLink in discharge instructions. Highlight this text and delete it to clear this message}      General drug facts      If you have a very bad allergy, wear an allergy ID at all times.  It is important that you take the medication exactly as they are prescribed.  Keep your medication in the bottles provided by the pharmacist.  Yudy Poles a list of all your drugs (prescription, natural products, vitamins, OTC) with you. Give this list to your doctor.  Do not take other medications without consulting your doctor.   Do not share your drugs with others and do not take anyone else's drugs.  Keep all drugs out of the reach of children and pets.   Most drugs may be thrown away in household trash after mixing with coffee grounds or sacha litter and sealing in a plastic bag.   Keep a list Call your doctor for help with any side effects. If in the [place] you may also call the FDA at 3-121-SQI-1349     Talk with the doctor before starting any new drug, including OTC, natural products, or vitamins. What to do at Home    1. Recommended diet: Full liquid for few days and advance as tolerated, Low fat    2. Recommended activity: Activity as tolerated    3. If you experience any of the following symptoms then please call your primary care physician or return to the emergency room if you cannot get hold of your doctor:    4. Wound Care: none    5. Lab work: cmp in 1 week     6. Bring these papers with you to your follow up appointments. The papers will help your doctors be sure to continue the care plan from the hospital.      If you have questions regarding the hospital related prescriptions or hospital related issues please call [facility] at [phone]. You can always direct your questions to your primary care doctor if you are unable to reach your hospital physician; your PCP works as an extension of your hospital doctor just like your hospital doctor is an extension of your PCP for your time at the hospital [facility])      Follow-up with:   PCP: Tierra Hernandez NP  Follow-up Information     Follow up With Specialties Details Why Contact Info    Wendy Bill NP Nurse Practitioner   [address] [place] [address]  [phone]      Jalen Montanez MD Gastroenterology Schedule an appointment as soon as possible for a visit in 3 weeks  Kenneth Kendrick [address] [place] [address]  [phone]      Dr Wilner Bunch orthopedics in am tomorrow as scheduled. Please call for your own appointment        Information obtained by :  I understand that if any problems occur once I am at home I am to contact my physician. I understand and acknowledge receipt of the instructions indicated above.                                                                                                                                            Physician's or R.N.'s Signature                                                                  Date/Time                                                                                                                                              Patient or Representative Signature                                                          Date/Time

## 2021-11-19 ENCOUNTER — OFFICE VISIT (OUTPATIENT)
Dept: ORTHOPEDIC SURGERY | Age: 51
End: 2021-11-19
Payer: COMMERCIAL

## 2021-11-19 DIAGNOSIS — M76.822 POSTERIOR TIBIAL TENDINITIS OF LEFT LOWER EXTREMITY: Primary | ICD-10-CM

## 2021-11-19 DIAGNOSIS — S92.301D CLOSED AVULSION FRACTURE OF METATARSAL BONE OF RIGHT FOOT WITH ROUTINE HEALING, SUBSEQUENT ENCOUNTER: ICD-10-CM

## 2021-11-19 PROCEDURE — 99213 OFFICE O/P EST LOW 20 MIN: CPT | Performed by: ORTHOPAEDIC SURGERY

## 2021-11-19 NOTE — PROGRESS NOTES
Chief Complaint   Patient presents with    Follow-up     Right pinky toe fracture     Leon Sullivan (: 1970) is a 46 y.o. female, patient,here for evaluation of the following   Chief Complaint   Patient presents with    Follow-up     Right pinky toe fracture        ASSESSMENT/PLAN:  Below is the assessment and plan developed based on review of pertinent history, physical exam, labs, studies, and medications. 1. Posterior tibial tendinitis of left lower extremity  2. Closed avulsion fracture of metatarsal bone of right foot with routine healing, subsequent encounter  -     XR FOOT RT MIN 3 V; Future  -     REFERRAL TO PHYSICAL THERAPY  Patient is having some forefoot pain unrelated to area of fracture. Appears related to gait changes after injury. She also complains of left ankle pain she does have a bit of posterior tibial tendinitis. This is a new problem since I saw her last.  She is referred to physical therapy for both problems. I anticipate full recovery. She is provided a final parking placard for months. Return if symptoms worsen or fail to improve. No Known Allergies    Current Outpatient Medications   Medication Sig    buPROPion SR (WELLBUTRIN, ZYBAN) 200 mg SR tablet Take 200 mg by mouth daily.  citalopram (CELEXA) 40 mg tablet Take 40 mg by mouth daily.  Cetirizine (ZyrTEC) 10 mg cap Take 10 mg by mouth daily.  pantoprazole (Protonix) 40 mg tablet Take 40 mg by mouth daily.  ibuprofen (MOTRIN) 200 mg tablet Take 800 mg by mouth every eight (8) hours as needed for Pain.  simethicone (Gas-X Extra Strength) 125 mg chewable tablet Take 125 mg by mouth every six (6) hours as needed for Flatulence. No current facility-administered medications for this visit. No past medical history on file. No past surgical history on file. No family history on file.     Social History     Socioeconomic History    Marital status:      Spouse name: Not on file  Number of children: Not on file    Years of education: Not on file    Highest education level: Not on file   Occupational History    Not on file   Tobacco Use    Smoking status: Never Smoker    Smokeless tobacco: Never Used   Substance and Sexual Activity    Alcohol use: Never    Drug use: Not on file    Sexual activity: Not on file   Other Topics Concern    Not on file   Social History Narrative    Not on file     Social Determinants of Health     Financial Resource Strain:     Difficulty of Paying Living Expenses: Not on file   Food Insecurity:     Worried About Running Out of Food in the Last Year: Not on file    Lizy of Food in the Last Year: Not on file   Transportation Needs:     Lack of Transportation (Medical): Not on file    Lack of Transportation (Non-Medical): Not on file   Physical Activity:     Days of Exercise per Week: Not on file    Minutes of Exercise per Session: Not on file   Stress:     Feeling of Stress : Not on file   Social Connections:     Frequency of Communication with Friends and Family: Not on file    Frequency of Social Gatherings with Friends and Family: Not on file    Attends Zoroastrianism Services: Not on file    Active Member of [address] [place] [address] or Organizations: Not on file    Attends Club or Organization Meetings: Not on file    Marital Status: Not on file   Intimate Partner Violence:     Fear of Current or Ex-Partner: Not on file    Emotionally Abused: Not on file    Physically Abused: Not on file    Sexually Abused: Not on file   Housing Stability:     Unable to Pay for Housing in the Last Year: Not on file    Number of [place] in the Last Year: Not on file    Unstable Housing in the Last Year: Not on file           Vitals: There were no vitals taken for this visit. There is no height or weight on file to calculate BMI.             SUBJECTIVE/OBJECTIVE:  Naseem Garcia (: 1970) patient is doing okay, she is now experiencing pain towards the lateral forefoot which is not at area of fracture. She had a fracture more proximally at the base of fifth metatarsal.  She has no other complaints at the right foot other than the lateral forefoot pain. At the left foot and ankle she is experiencing pain along the medial aspect of foot and ankle. This is a problem she started to feel after wearing the boot. No injuries of the left lower extremity. Physical Exam  Pleasant well-nourished female , alert and oriented to person, time and place, no acute distress. Normal gait, normal weightbearing stance. Left ankle: Tenderness to palpation at the posterior tibial tendon starting from proximal to the navicular into the area of the medial malleolus area. No significant swelling, no instabilities, no other areas of tenderness to palpation. Right foot: Tender under the forefoot fifth metatarsal head, no crepitus, nontender fifth metatarsal, at base of fifth metatarsal where she had fracture, that is nontender. Contralateral foot and ankle exam, nontender, no swelling ligaments grossly stable. Normal weightbearing stance. Neurovascular exam intact for light touch sensation, cap refill, dorsalis pedis pulse palpable, flexion/extension strength 5/5. Skin intact without open wounds, lesions or ulcers, no skin discolorations, no warmth to skin. Imaging:    XR Results (most recent):  Results from Appointment encounter on 11/19/21    XR FOOT RT MIN 3 V    Narrative  Right foot 3 views nonweightbearing x-rays show the avulsion fracture base of fifth metatarsal is completely healed, there is also a more proximal metaphyseal diaphyseal junction fracture which is healed. There are no fractures distally at the fifth metatarsal or fourth metatarsal.  Satisfactory bone density. An electronic signature was used to authenticate this note.   -- Era Hernadez MD

## 2022-03-20 PROBLEM — K85.90 ACUTE PANCREATITIS: Status: ACTIVE | Noted: 2021-07-19

## 2023-05-10 ENCOUNTER — HOSPITAL ENCOUNTER (EMERGENCY)
Facility: HOSPITAL | Age: 53
Discharge: HOME | End: 2023-05-10
Attending: EMERGENCY MEDICINE
Payer: COMMERCIAL

## 2023-05-10 VITALS
SYSTOLIC BLOOD PRESSURE: 119 MMHG | WEIGHT: 185.63 LBS | TEMPERATURE: 98.4 F | OXYGEN SATURATION: 98 % | HEART RATE: 81 BPM | HEIGHT: 63 IN | BODY MASS INDEX: 32.89 KG/M2 | RESPIRATION RATE: 18 BRPM | DIASTOLIC BLOOD PRESSURE: 83 MMHG

## 2023-05-10 DIAGNOSIS — K85.90 ACUTE PANCREATITIS, UNSPECIFIED COMPLICATION STATUS, UNSPECIFIED PANCREATITIS TYPE: Primary | ICD-10-CM

## 2023-05-10 LAB
ALBUMIN SERPL-MCNC: 3.4 G/DL (ref 3.5–5)
ALBUMIN/GLOB SERPL: 1 (ref 1.1–2.2)
ALP SERPL-CCNC: 111 U/L (ref 45–117)
ALT SERPL-CCNC: 60 U/L (ref 12–78)
ANION GAP SERPL CALC-SCNC: 5 MMOL/L (ref 5–15)
APPEARANCE UR: CLEAR
AST SERPL-CCNC: 112 U/L (ref 15–37)
BACTERIA URNS QL MICRO: ABNORMAL /HPF
BASOPHILS # BLD: 0 K/UL (ref 0–0.1)
BASOPHILS NFR BLD: 0 % (ref 0–1)
BILIRUB SERPL-MCNC: 0.5 MG/DL (ref 0.2–1)
BILIRUB UR QL: NEGATIVE
BUN SERPL-MCNC: 16 MG/DL (ref 6–20)
BUN/CREAT SERPL: 18 (ref 12–20)
CALCIUM SERPL-MCNC: 9 MG/DL (ref 8.5–10.1)
CHLORIDE SERPL-SCNC: 105 MMOL/L (ref 97–108)
CO2 SERPL-SCNC: 29 MMOL/L (ref 21–32)
COLOR UR: ABNORMAL
CREAT SERPL-MCNC: 0.88 MG/DL (ref 0.55–1.02)
DIFFERENTIAL METHOD BLD: ABNORMAL
EKG ATRIAL RATE: 71 BPM
EKG DIAGNOSIS: NORMAL
EKG P AXIS: 26 DEGREES
EKG P-R INTERVAL: 152 MS
EKG Q-T INTERVAL: 424 MS
EKG QRS DURATION: 84 MS
EKG QTC CALCULATION (BAZETT): 460 MS
EKG R AXIS: 53 DEGREES
EKG T AXIS: 23 DEGREES
EKG VENTRICULAR RATE: 71 BPM
EOSINOPHIL # BLD: 0.1 K/UL (ref 0–0.4)
EOSINOPHIL NFR BLD: 1 % (ref 0–7)
EPITH CASTS URNS QL MICRO: ABNORMAL /LPF
ERYTHROCYTE [DISTWIDTH] IN BLOOD BY AUTOMATED COUNT: 13.1 % (ref 11.5–14.5)
GLOBULIN SER CALC-MCNC: 3.5 G/DL (ref 2–4)
GLUCOSE SERPL-MCNC: 91 MG/DL (ref 65–100)
GLUCOSE UR STRIP.AUTO-MCNC: NEGATIVE MG/DL
HCT VFR BLD AUTO: 40 % (ref 35–47)
HGB BLD-MCNC: 12.9 G/DL (ref 11.5–16)
HGB UR QL STRIP: ABNORMAL
IMM GRANULOCYTES # BLD AUTO: 0 K/UL (ref 0–0.04)
IMM GRANULOCYTES NFR BLD AUTO: 0 % (ref 0–0.5)
KETONES UR QL STRIP.AUTO: NEGATIVE MG/DL
LEUKOCYTE ESTERASE UR QL STRIP.AUTO: ABNORMAL
LIPASE SERPL-CCNC: 2923 U/L (ref 73–393)
LYMPHOCYTES # BLD: 0.7 K/UL (ref 0.8–3.5)
LYMPHOCYTES NFR BLD: 12 % (ref 12–49)
MCH RBC QN AUTO: 29 PG (ref 26–34)
MCHC RBC AUTO-ENTMCNC: 32.3 G/DL (ref 30–36.5)
MCV RBC AUTO: 89.9 FL (ref 80–99)
MONOCYTES # BLD: 0.4 K/UL (ref 0–1)
MONOCYTES NFR BLD: 7 % (ref 5–13)
MUCOUS THREADS URNS QL MICRO: ABNORMAL /LPF
NEUTS SEG # BLD: 4.6 K/UL (ref 1.8–8)
NEUTS SEG NFR BLD: 80 % (ref 32–75)
NITRITE UR QL STRIP.AUTO: NEGATIVE
NRBC # BLD: 0 K/UL (ref 0–0.01)
NRBC BLD-RTO: 0 PER 100 WBC
PH UR STRIP: 5.5 (ref 5–8)
PLATELET # BLD AUTO: 170 K/UL (ref 150–400)
PMV BLD AUTO: 10.1 FL (ref 8.9–12.9)
POTASSIUM SERPL-SCNC: 3.9 MMOL/L (ref 3.5–5.1)
PROT SERPL-MCNC: 6.9 G/DL (ref 6.4–8.2)
PROT UR STRIP-MCNC: ABNORMAL MG/DL
RBC # BLD AUTO: 4.45 M/UL (ref 3.8–5.2)
RBC #/AREA URNS HPF: ABNORMAL /HPF (ref 0–5)
RBC MORPH BLD: ABNORMAL
SODIUM SERPL-SCNC: 139 MMOL/L (ref 136–145)
SP GR UR REFRACTOMETRY: 1.03
TROPONIN I SERPL HS-MCNC: 6 NG/L (ref 0–51)
URINE CULTURE IF INDICATED: ABNORMAL
UROBILINOGEN UR QL STRIP.AUTO: 1 EU/DL (ref 0.2–1)
WBC # BLD AUTO: 5.8 K/UL (ref 3.6–11)
WBC URNS QL MICRO: ABNORMAL /HPF (ref 0–4)

## 2023-05-10 PROCEDURE — 96374 THER/PROPH/DIAG INJ IV PUSH: CPT

## 2023-05-10 PROCEDURE — 96375 TX/PRO/DX INJ NEW DRUG ADDON: CPT

## 2023-05-10 PROCEDURE — 84484 ASSAY OF TROPONIN QUANT: CPT

## 2023-05-10 PROCEDURE — 85025 COMPLETE CBC W/AUTO DIFF WBC: CPT

## 2023-05-10 PROCEDURE — 87086 URINE CULTURE/COLONY COUNT: CPT

## 2023-05-10 PROCEDURE — 96361 HYDRATE IV INFUSION ADD-ON: CPT

## 2023-05-10 PROCEDURE — 93005 ELECTROCARDIOGRAM TRACING: CPT

## 2023-05-10 PROCEDURE — 83690 ASSAY OF LIPASE: CPT

## 2023-05-10 PROCEDURE — 36415 COLL VENOUS BLD VENIPUNCTURE: CPT

## 2023-05-10 PROCEDURE — A4216 STERILE WATER/SALINE, 10 ML: HCPCS

## 2023-05-10 PROCEDURE — 6360000002 HC RX W HCPCS

## 2023-05-10 PROCEDURE — 99284 EMERGENCY DEPT VISIT MOD MDM: CPT

## 2023-05-10 PROCEDURE — 81001 URINALYSIS AUTO W/SCOPE: CPT

## 2023-05-10 PROCEDURE — 80053 COMPREHEN METABOLIC PANEL: CPT

## 2023-05-10 PROCEDURE — 2500000003 HC RX 250 WO HCPCS

## 2023-05-10 PROCEDURE — 2580000003 HC RX 258

## 2023-05-10 RX ORDER — HYDROCODONE BITARTRATE AND ACETAMINOPHEN 5; 325 MG/1; MG/1
1 TABLET ORAL EVERY 4 HOURS PRN
Qty: 12 TABLET | Refills: 0 | Status: SHIPPED | OUTPATIENT
Start: 2023-05-10 | End: 2023-05-10 | Stop reason: SDUPTHER

## 2023-05-10 RX ORDER — KETOROLAC TROMETHAMINE 30 MG/ML
30 INJECTION, SOLUTION INTRAMUSCULAR; INTRAVENOUS
Status: COMPLETED | OUTPATIENT
Start: 2023-05-10 | End: 2023-05-10

## 2023-05-10 RX ORDER — POLYETHYLENE GLYCOL 3350 17 G/17G
17 POWDER, FOR SOLUTION ORAL DAILY PRN
Qty: 116 G | Refills: 1 | Status: SHIPPED | OUTPATIENT
Start: 2023-05-10 | End: 2023-05-24

## 2023-05-10 RX ORDER — IBUPROFEN 800 MG/1
800 TABLET ORAL EVERY 8 HOURS PRN
Qty: 30 TABLET | Refills: 0 | Status: SHIPPED | OUTPATIENT
Start: 2023-05-10

## 2023-05-10 RX ORDER — ONDANSETRON 4 MG/1
4 TABLET, FILM COATED ORAL EVERY 8 HOURS PRN
Qty: 20 TABLET | Refills: 0 | Status: SHIPPED | OUTPATIENT
Start: 2023-05-10

## 2023-05-10 RX ORDER — 0.9 % SODIUM CHLORIDE 0.9 %
1000 INTRAVENOUS SOLUTION INTRAVENOUS ONCE
Status: COMPLETED | OUTPATIENT
Start: 2023-05-10 | End: 2023-05-10

## 2023-05-10 RX ORDER — IBUPROFEN 800 MG/1
800 TABLET ORAL EVERY 8 HOURS PRN
Qty: 30 TABLET | Refills: 0 | Status: SHIPPED | OUTPATIENT
Start: 2023-05-10 | End: 2023-05-10 | Stop reason: SDUPTHER

## 2023-05-10 RX ORDER — HYDROCODONE BITARTRATE AND ACETAMINOPHEN 5; 325 MG/1; MG/1
1 TABLET ORAL EVERY 4 HOURS PRN
Qty: 12 TABLET | Refills: 0 | Status: SHIPPED | OUTPATIENT
Start: 2023-05-10 | End: 2023-05-17

## 2023-05-10 RX ORDER — POLYETHYLENE GLYCOL 3350 17 G/17G
17 POWDER, FOR SOLUTION ORAL DAILY PRN
Qty: 116 G | Refills: 1 | Status: SHIPPED | OUTPATIENT
Start: 2023-05-10 | End: 2023-05-10 | Stop reason: SDUPTHER

## 2023-05-10 RX ADMIN — FAMOTIDINE 20 MG: 10 INJECTION, SOLUTION INTRAVENOUS at 07:09

## 2023-05-10 RX ADMIN — KETOROLAC TROMETHAMINE 30 MG: 30 INJECTION, SOLUTION INTRAMUSCULAR; INTRAVENOUS at 07:10

## 2023-05-10 RX ADMIN — SODIUM CHLORIDE 1000 ML: 9 INJECTION, SOLUTION INTRAVENOUS at 08:45

## 2023-05-10 ASSESSMENT — PAIN SCALES - GENERAL: PAINLEVEL_OUTOF10: 1

## 2023-05-10 ASSESSMENT — PAIN DESCRIPTION - LOCATION: LOCATION: ABDOMEN

## 2023-05-10 NOTE — ED PROVIDER NOTES
I saw and evaluated this patient Paramjit Echavarria in person on 5/10/2023. I personally performed a substantive portion of the visit including all aspects of the medical decision making as outlined below. 42-year-old female with remote history of cholecystectomy and an episode of idiopathic pancreatitis about 2 years ago, without any significant findings at that time including normal CT and normal MRCP, complains of epigastric pain which is 9 out of 10 in severity, sharp and stabbing, for the past couple days, reminiscent of her prior episode of pancreatitis. No significant nausea and vomiting. She denies significant alcohol consumption. Well-appearing, no distress, pain significantly alleviated with Toradol. Reassuring abdominal exam.    Lipase is markedly elevated, almost 3000, without other significant signs of necrotizing pancreatitis or sepsis. Given her reassuring appearance and physical exam and lack of significant risk factors and prior cholecystectomy I think that she can be safely discharged. By Laura's criteria she is very low risk. No concern for ACS or MI.     Napoleon Schwartz MD  3:04 PM      Zaria Meyer MD  05/10/23 8733

## 2023-05-10 NOTE — ED PROVIDER NOTES
[facility] EMERGENCY DEPT  EMERGENCY DEPARTMENT ENCOUNTER       Pt Name: Gloria Wang  MRN: [id number]  [place] 1970  Date of Evaluation: 5/10/2023  Provider: OMID Linda NP   PCP: OMID Marquez NP  Note Started: 9:10 AM 5/10/23     CHIEF COMPLAINT       Chief Complaint   Patient presents with    Abdominal Pain     Pt ambulatory into triage with c/o abdominal pain and NVD x2 days. Pt reports she feels the same as she did when she had pancreatitis 2 years ago. HISTORY OF PRESENT ILLNESS: 1 or more elements      History From: Patient  None     Gloria Wang is a 46 y.o. female with hx of GERD and HLD who presents with son to the ED today complaining of abdominal pain x2 days. Pain is 9 out of 10 and is like a sharp stabbing pain. Patient states this happened about ~2 years ago and she was found to have pancreatitis and was hospitalized. Patient has had cholecystectomy. Patient denies alcohol use. Patient denies headaches, fevers, chills, chest pain, jaw pain, numbness, tingling. Nursing Notes were all reviewed and agreed with or any disagreements were addressed in the HPI. REVIEW OF SYSTEMS      Review of Systems     Positives and Pertinent negatives as per HPI. PAST HISTORY     Past Medical History:  No past medical history on file. Past Surgical History:  No past surgical history on file. Family History:  No family history on file. Social History:  Social History     Tobacco Use    Smoking status: Never    Smokeless tobacco: Never   Substance Use Topics    Alcohol use: Never       Allergies:  No Known Allergies    CURRENT MEDICATIONS      Previous Medications    No medications on file       PHYSICAL EXAM      Vitals:    05/10/23 0613   BP: 119/83   Pulse: 81   Resp: 18   Temp: 98.4 °F (36.9 °C)   TempSrc: Oral   SpO2: 98%   Weight: 84.2 kg (185 lb 10 oz)   Height: 1.6 m (5' 3\")       Physical Exam  Constitutional:       Appearance: She is well-developed.  She is

## 2023-05-11 LAB
BACTERIA SPEC CULT: NORMAL
CC UR VC: NORMAL
SERVICE CMNT-IMP: NORMAL

## 2023-08-05 ENCOUNTER — OFFICE VISIT (OUTPATIENT)
Age: 53
End: 2023-08-05

## 2023-08-05 VITALS
RESPIRATION RATE: 16 BRPM | DIASTOLIC BLOOD PRESSURE: 81 MMHG | OXYGEN SATURATION: 98 % | WEIGHT: 184 LBS | HEART RATE: 80 BPM | HEIGHT: 63 IN | BODY MASS INDEX: 32.6 KG/M2 | SYSTOLIC BLOOD PRESSURE: 125 MMHG | TEMPERATURE: 98.4 F

## 2023-08-05 DIAGNOSIS — S93.402A SPRAIN OF LEFT ANKLE, UNSPECIFIED LIGAMENT, INITIAL ENCOUNTER: Primary | ICD-10-CM

## 2023-08-05 DIAGNOSIS — S93.602A FOOT SPRAIN, LEFT, INITIAL ENCOUNTER: ICD-10-CM

## 2023-08-05 RX ORDER — CITALOPRAM 40 MG/1
TABLET ORAL
COMMUNITY
Start: 2023-07-29

## 2023-08-05 RX ORDER — PANTOPRAZOLE SODIUM 40 MG/1
40 TABLET, DELAYED RELEASE ORAL DAILY
COMMUNITY

## 2023-08-05 RX ORDER — ATORVASTATIN CALCIUM 10 MG/1
10 TABLET, FILM COATED ORAL DAILY
COMMUNITY
Start: 2023-06-19

## 2023-08-05 RX ORDER — BUPROPION HYDROCHLORIDE 200 MG/1
1 TABLET, EXTENDED RELEASE ORAL EVERY MORNING
COMMUNITY

## 2023-08-20 ENCOUNTER — APPOINTMENT (OUTPATIENT)
Facility: HOSPITAL | Age: 53
End: 2023-08-20
Payer: COMMERCIAL

## 2023-08-20 ENCOUNTER — HOSPITAL ENCOUNTER (EMERGENCY)
Facility: HOSPITAL | Age: 53
Discharge: HOME | End: 2023-08-21
Attending: EMERGENCY MEDICINE
Payer: COMMERCIAL

## 2023-08-20 VITALS
HEART RATE: 63 BPM | DIASTOLIC BLOOD PRESSURE: 63 MMHG | TEMPERATURE: 98.3 F | OXYGEN SATURATION: 98 % | RESPIRATION RATE: 16 BRPM | SYSTOLIC BLOOD PRESSURE: 110 MMHG

## 2023-08-20 DIAGNOSIS — S32.010A CLOSED COMPRESSION FRACTURE OF L1 LUMBAR VERTEBRA, INITIAL ENCOUNTER (HCC): Primary | ICD-10-CM
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
## 2023-08-20 LAB
ALBUMIN SERPL-MCNC: 3.4 G/DL (ref 3.5–5)
ALBUMIN/GLOB SERPL: 1.1 (ref 1.1–2.2)
ALP SERPL-CCNC: 87 U/L (ref 45–117)
ALT SERPL-CCNC: 52 U/L (ref 12–78)
ANION GAP SERPL CALC-SCNC: 3 MMOL/L (ref 5–15)
AST SERPL-CCNC: 92 U/L (ref 15–37)
BASOPHILS # BLD: 0 K/UL (ref 0–0.1)
BASOPHILS NFR BLD: 0 % (ref 0–1)
BILIRUB SERPL-MCNC: 0.6 MG/DL (ref 0.2–1)
BUN SERPL-MCNC: 16 MG/DL (ref 6–20)
BUN/CREAT SERPL: 18 (ref 12–20)
CALCIUM SERPL-MCNC: 8.5 MG/DL (ref 8.5–10.1)
CHLORIDE SERPL-SCNC: 106 MMOL/L (ref 97–108)
CO2 SERPL-SCNC: 29 MMOL/L (ref 21–32)
CREAT SERPL-MCNC: 0.9 MG/DL (ref 0.55–1.02)
DIFFERENTIAL METHOD BLD: NORMAL
EOSINOPHIL # BLD: 0.1 K/UL (ref 0–0.4)
EOSINOPHIL NFR BLD: 1 % (ref 0–7)
ERYTHROCYTE [DISTWIDTH] IN BLOOD BY AUTOMATED COUNT: 12.7 % (ref 11.5–14.5)
GLOBULIN SER CALC-MCNC: 3.1 G/DL (ref 2–4)
GLUCOSE SERPL-MCNC: 126 MG/DL (ref 65–100)
HCT VFR BLD AUTO: 37.1 % (ref 35–47)
HGB BLD-MCNC: 12.3 G/DL (ref 11.5–16)
IMM GRANULOCYTES # BLD AUTO: 0 K/UL (ref 0–0.04)
IMM GRANULOCYTES NFR BLD AUTO: 0 % (ref 0–0.5)
LIPASE SERPL-CCNC: 376 U/L (ref 73–393)
LYMPHOCYTES # BLD: 1.4 K/UL (ref 0.8–3.5)
LYMPHOCYTES NFR BLD: 21 % (ref 12–49)
MCH RBC QN AUTO: 30.1 PG (ref 26–34)
MCHC RBC AUTO-ENTMCNC: 33.2 G/DL (ref 30–36.5)
MCV RBC AUTO: 90.7 FL (ref 80–99)
MONOCYTES # BLD: 0.4 K/UL (ref 0–1)
MONOCYTES NFR BLD: 6 % (ref 5–13)
NEUTS SEG # BLD: 4.9 K/UL (ref 1.8–8)
NEUTS SEG NFR BLD: 72 % (ref 32–75)
NRBC # BLD: 0 K/UL (ref 0–0.01)
NRBC BLD-RTO: 0 PER 100 WBC
PLATELET # BLD AUTO: 171 K/UL (ref 150–400)
PMV BLD AUTO: 10.4 FL (ref 8.9–12.9)
POTASSIUM SERPL-SCNC: 3.6 MMOL/L (ref 3.5–5.1)
PROT SERPL-MCNC: 6.5 G/DL (ref 6.4–8.2)
RBC # BLD AUTO: 4.09 M/UL (ref 3.8–5.2)
SODIUM SERPL-SCNC: 138 MMOL/L (ref 136–145)
WBC # BLD AUTO: 6.8 K/UL (ref 3.6–11)

## 2023-08-20 PROCEDURE — 83690 ASSAY OF LIPASE: CPT

## 2023-08-20 PROCEDURE — 36415 COLL VENOUS BLD VENIPUNCTURE: CPT

## 2023-08-20 PROCEDURE — 6360000002 HC RX W HCPCS: Performed by: EMERGENCY MEDICINE

## 2023-08-20 PROCEDURE — 72131 CT LUMBAR SPINE W/O DYE: CPT

## 2023-08-20 PROCEDURE — 6370000000 HC RX 637 (ALT 250 FOR IP): Performed by: EMERGENCY MEDICINE

## 2023-08-20 PROCEDURE — 2580000003 HC RX 258: Performed by: EMERGENCY MEDICINE

## 2023-08-20 PROCEDURE — 85025 COMPLETE CBC W/AUTO DIFF WBC: CPT

## 2023-08-20 PROCEDURE — 2500000003 HC RX 250 WO HCPCS: Performed by: EMERGENCY MEDICINE

## 2023-08-20 PROCEDURE — 99284 EMERGENCY DEPT VISIT MOD MDM: CPT

## 2023-08-20 PROCEDURE — 96374 THER/PROPH/DIAG INJ IV PUSH: CPT

## 2023-08-20 PROCEDURE — 96372 THER/PROPH/DIAG INJ SC/IM: CPT

## 2023-08-20 PROCEDURE — 80053 COMPREHEN METABOLIC PANEL: CPT

## 2023-08-20 RX ORDER — ONDANSETRON 4 MG/1
4 TABLET, ORALLY DISINTEGRATING ORAL 3 TIMES DAILY PRN
Qty: 21 TABLET | Refills: 0 | Status: SHIPPED | OUTPATIENT
Start: 2023-08-20

## 2023-08-20 RX ORDER — OXYCODONE HYDROCHLORIDE AND ACETAMINOPHEN 5; 325 MG/1; MG/1
2 TABLET ORAL EVERY 6 HOURS PRN
Qty: 20 TABLET | Refills: 0 | Status: SHIPPED | OUTPATIENT
Start: 2023-08-20 | End: 2023-08-22 | Stop reason: SDUPTHER

## 2023-08-20 RX ORDER — 0.9 % SODIUM CHLORIDE 0.9 %
1000 INTRAVENOUS SOLUTION INTRAVENOUS ONCE
Status: COMPLETED | OUTPATIENT
Start: 2023-08-20 | End: 2023-08-21

## 2023-08-20 RX ORDER — HYDROMORPHONE HYDROCHLORIDE 1 MG/ML
1 INJECTION, SOLUTION INTRAMUSCULAR; INTRAVENOUS; SUBCUTANEOUS
Status: COMPLETED | OUTPATIENT
Start: 2023-08-20 | End: 2023-08-20

## 2023-08-20 RX ORDER — PREDNISONE 20 MG/1
40 TABLET ORAL DAILY
Qty: 10 TABLET | Refills: 0 | Status: SHIPPED | OUTPATIENT
Start: 2023-08-20 | End: 2023-08-25

## 2023-08-20 RX ORDER — IBUPROFEN 600 MG/1
600 TABLET ORAL EVERY 6 HOURS PRN
Qty: 20 TABLET | Refills: 1 | Status: SHIPPED | OUTPATIENT
Start: 2023-08-20 | End: 2023-08-30

## 2023-08-20 RX ORDER — ONDANSETRON 2 MG/ML
4 INJECTION INTRAMUSCULAR; INTRAVENOUS ONCE
Status: COMPLETED | OUTPATIENT
Start: 2023-08-20 | End: 2023-08-20

## 2023-08-20 RX ORDER — OXYCODONE HYDROCHLORIDE AND ACETAMINOPHEN 5; 325 MG/1; MG/1
2 TABLET ORAL
Status: COMPLETED | OUTPATIENT
Start: 2023-08-20 | End: 2023-08-20

## 2023-08-20 RX ORDER — KETOROLAC TROMETHAMINE 30 MG/ML
30 INJECTION, SOLUTION INTRAMUSCULAR; INTRAVENOUS
Status: COMPLETED | OUTPATIENT
Start: 2023-08-20 | End: 2023-08-20

## 2023-08-20 RX ADMIN — HYDROMORPHONE HYDROCHLORIDE 1 MG: 1 INJECTION, SOLUTION INTRAMUSCULAR; INTRAVENOUS; SUBCUTANEOUS at 22:44

## 2023-08-20 RX ADMIN — ONDANSETRON 4 MG: 2 INJECTION INTRAMUSCULAR; INTRAVENOUS at 22:44

## 2023-08-20 RX ADMIN — SODIUM CHLORIDE 1000 ML: 9 INJECTION, SOLUTION INTRAVENOUS at 22:43

## 2023-08-20 RX ADMIN — KETOROLAC TROMETHAMINE 30 MG: 30 INJECTION, SOLUTION INTRAMUSCULAR; INTRAVENOUS at 20:12

## 2023-08-20 RX ADMIN — OXYCODONE HYDROCHLORIDE AND ACETAMINOPHEN 2 TABLET: 5; 325 TABLET ORAL at 20:12

## 2023-08-20 ASSESSMENT — LIFESTYLE VARIABLES
HOW OFTEN DO YOU HAVE A DRINK CONTAINING ALCOHOL: NEVER
HOW MANY STANDARD DRINKS CONTAINING ALCOHOL DO YOU HAVE ON A TYPICAL DAY: PATIENT DOES NOT DRINK

## 2023-08-20 ASSESSMENT — PAIN SCALES - GENERAL
PAINLEVEL_OUTOF10: 8
PAINLEVEL_OUTOF10: 3

## 2023-08-20 ASSESSMENT — ENCOUNTER SYMPTOMS: BACK PAIN: 1

## 2023-08-21 NOTE — DISCHARGE INSTRUCTIONS
It was a pleasure taking care of you at [facility] Emergency Department today. We know that when you come to [facility], you are entrusting us with your health, comfort, and safety. Our physicians and nurses honor that trust, and we truly appreciate the opportunity to care for you and your loved ones. We also value your feedback. If you receive a survey about your Emergency Department experience today, please fill it out. We care about our patients' feedback, and we listen to what you have to say. Thank you!

## 2023-08-22 RX ORDER — OXYCODONE HYDROCHLORIDE AND ACETAMINOPHEN 5; 325 MG/1; MG/1
2 TABLET ORAL EVERY 6 HOURS PRN
Qty: 20 TABLET | Refills: 0 | Status: SHIPPED | OUTPATIENT
Start: 2023-08-22 | End: 2023-08-29

## 2023-09-07 ENCOUNTER — HOSPITAL ENCOUNTER (OUTPATIENT)
Facility: HOSPITAL | Age: 53
Discharge: HOME | End: 2023-09-07
Payer: COMMERCIAL

## 2023-09-07 VITALS — WEIGHT: 184 LBS | BODY MASS INDEX: 32.6 KG/M2 | HEIGHT: 63 IN

## 2023-09-07 DIAGNOSIS — M85.80 OSTEOPENIA DETERMINED BY X-RAY: ICD-10-CM

## 2023-09-07 PROCEDURE — 77080 DXA BONE DENSITY AXIAL: CPT

## 2025-09-06 ENCOUNTER — OFFICE VISIT (OUTPATIENT)
Age: 55
End: 2025-09-06

## 2025-09-06 VITALS
RESPIRATION RATE: 18 BRPM | SYSTOLIC BLOOD PRESSURE: 94 MMHG | DIASTOLIC BLOOD PRESSURE: 66 MMHG | OXYGEN SATURATION: 98 % | HEIGHT: 63 IN | TEMPERATURE: 98.3 F | HEART RATE: 104 BPM | WEIGHT: 171 LBS | BODY MASS INDEX: 30.3 KG/M2

## 2025-09-06 DIAGNOSIS — R50.9 FEVER, UNSPECIFIED FEVER CAUSE: Primary | ICD-10-CM

## 2025-09-06 RX ORDER — CETIRIZINE HYDROCHLORIDE 10 MG/1
10 CAPSULE, LIQUID FILLED ORAL DAILY
COMMUNITY